# Patient Record
Sex: FEMALE | Race: BLACK OR AFRICAN AMERICAN | NOT HISPANIC OR LATINO | Employment: OTHER | URBAN - METROPOLITAN AREA
[De-identification: names, ages, dates, MRNs, and addresses within clinical notes are randomized per-mention and may not be internally consistent; named-entity substitution may affect disease eponyms.]

---

## 2023-02-21 LAB
ALANINE AMINOTRANSFERASE (SGPT) (U/L) IN SER/PLAS: 17 U/L (ref 7–45)
ALBUMIN (G/DL) IN SER/PLAS: 4.4 G/DL (ref 3.4–5)
ALKALINE PHOSPHATASE (U/L) IN SER/PLAS: 89 U/L (ref 33–136)
ANION GAP IN SER/PLAS: 10 MMOL/L (ref 10–20)
ASPARTATE AMINOTRANSFERASE (SGOT) (U/L) IN SER/PLAS: 23 U/L (ref 9–39)
BASOPHILS (10*3/UL) IN BLOOD BY AUTOMATED COUNT: 0.05 X10E9/L (ref 0–0.1)
BASOPHILS/100 LEUKOCYTES IN BLOOD BY AUTOMATED COUNT: 1 % (ref 0–2)
BILIRUBIN TOTAL (MG/DL) IN SER/PLAS: 0.7 MG/DL (ref 0–1.2)
CALCIDIOL (25 OH VITAMIN D3) (NG/ML) IN SER/PLAS: 37 NG/ML
CALCIUM (MG/DL) IN SER/PLAS: 9.2 MG/DL (ref 8.6–10.6)
CARBON DIOXIDE, TOTAL (MMOL/L) IN SER/PLAS: 31 MMOL/L (ref 21–32)
CHLORIDE (MMOL/L) IN SER/PLAS: 104 MMOL/L (ref 98–107)
CHOLESTEROL (MG/DL) IN SER/PLAS: 245 MG/DL (ref 0–199)
CHOLESTEROL IN HDL (MG/DL) IN SER/PLAS: 87.1 MG/DL
CHOLESTEROL/HDL RATIO: 2.8
CREATININE (MG/DL) IN SER/PLAS: 1.21 MG/DL (ref 0.5–1.05)
EOSINOPHILS (10*3/UL) IN BLOOD BY AUTOMATED COUNT: 0.09 X10E9/L (ref 0–0.4)
EOSINOPHILS/100 LEUKOCYTES IN BLOOD BY AUTOMATED COUNT: 1.8 % (ref 0–6)
ERYTHROCYTE DISTRIBUTION WIDTH (RATIO) BY AUTOMATED COUNT: 16 % (ref 11.5–14.5)
ERYTHROCYTE MEAN CORPUSCULAR HEMOGLOBIN CONCENTRATION (G/DL) BY AUTOMATED: 32.2 G/DL (ref 32–36)
ERYTHROCYTE MEAN CORPUSCULAR VOLUME (FL) BY AUTOMATED COUNT: 92 FL (ref 80–100)
ERYTHROCYTES (10*6/UL) IN BLOOD BY AUTOMATED COUNT: 4.03 X10E12/L (ref 4–5.2)
GFR FEMALE: 46 ML/MIN/1.73M2
GLUCOSE (MG/DL) IN SER/PLAS: 103 MG/DL (ref 74–99)
HEMATOCRIT (%) IN BLOOD BY AUTOMATED COUNT: 37 % (ref 36–46)
HEMOGLOBIN (G/DL) IN BLOOD: 11.9 G/DL (ref 12–16)
IMMATURE GRANULOCYTES/100 LEUKOCYTES IN BLOOD BY AUTOMATED COUNT: 0.2 % (ref 0–0.9)
LDL: 143 MG/DL (ref 0–99)
LEUKOCYTES (10*3/UL) IN BLOOD BY AUTOMATED COUNT: 5.1 X10E9/L (ref 4.4–11.3)
LYMPHOCYTES (10*3/UL) IN BLOOD BY AUTOMATED COUNT: 2.09 X10E9/L (ref 0.8–3)
LYMPHOCYTES/100 LEUKOCYTES IN BLOOD BY AUTOMATED COUNT: 41.1 % (ref 13–44)
MONOCYTES (10*3/UL) IN BLOOD BY AUTOMATED COUNT: 0.35 X10E9/L (ref 0.05–0.8)
MONOCYTES/100 LEUKOCYTES IN BLOOD BY AUTOMATED COUNT: 6.9 % (ref 2–10)
NEUTROPHILS (10*3/UL) IN BLOOD BY AUTOMATED COUNT: 2.5 X10E9/L (ref 1.6–5.5)
NEUTROPHILS/100 LEUKOCYTES IN BLOOD BY AUTOMATED COUNT: 49 % (ref 40–80)
NRBC (PER 100 WBCS) BY AUTOMATED COUNT: 0 /100 WBC (ref 0–0)
PARATHYRIN INTACT (PG/ML) IN SER/PLAS: 122.1 PG/ML (ref 18.5–88)
PLATELETS (10*3/UL) IN BLOOD AUTOMATED COUNT: 279 X10E9/L (ref 150–450)
POTASSIUM (MMOL/L) IN SER/PLAS: 3.8 MMOL/L (ref 3.5–5.3)
PROTEIN TOTAL: 7.1 G/DL (ref 6.4–8.2)
SODIUM (MMOL/L) IN SER/PLAS: 141 MMOL/L (ref 136–145)
THYROTROPIN (MIU/L) IN SER/PLAS BY DETECTION LIMIT <= 0.05 MIU/L: 1.74 MIU/L (ref 0.44–3.98)
TRIGLYCERIDE (MG/DL) IN SER/PLAS: 76 MG/DL (ref 0–149)
UREA NITROGEN (MG/DL) IN SER/PLAS: 17 MG/DL (ref 6–23)
VLDL: 15 MG/DL (ref 0–40)

## 2023-06-13 DIAGNOSIS — I10 HYPERTENSION, UNSPECIFIED TYPE: ICD-10-CM

## 2023-06-13 DIAGNOSIS — K21.9 GASTROESOPHAGEAL REFLUX DISEASE WITHOUT ESOPHAGITIS: ICD-10-CM

## 2023-06-15 RX ORDER — OMEPRAZOLE 20 MG/1
CAPSULE, DELAYED RELEASE ORAL
Qty: 90 CAPSULE | Refills: 0 | Status: SHIPPED | OUTPATIENT
Start: 2023-06-15 | End: 2023-08-28

## 2023-06-16 PROBLEM — E21.3 HYPERPARATHYROIDISM (MULTI): Status: ACTIVE | Noted: 2023-06-16

## 2023-06-16 PROBLEM — M67.40 GANGLION OF JOINT: Status: ACTIVE | Noted: 2023-06-16

## 2023-06-16 PROBLEM — M54.17 LUMBOSACRAL NEURITIS: Status: ACTIVE | Noted: 2023-06-16

## 2023-06-16 PROBLEM — L98.9 SKIN LESIONS: Status: ACTIVE | Noted: 2023-06-16

## 2023-06-16 PROBLEM — E87.6 DIURETIC-INDUCED HYPOKALEMIA: Status: ACTIVE | Noted: 2023-06-16

## 2023-06-16 PROBLEM — T50.2X5A DIURETIC-INDUCED HYPOKALEMIA: Status: ACTIVE | Noted: 2023-06-16

## 2023-06-16 PROBLEM — D64.9 ANEMIA: Status: ACTIVE | Noted: 2023-06-16

## 2023-06-16 PROBLEM — J31.0 RHINITIS: Status: ACTIVE | Noted: 2023-06-16

## 2023-06-16 PROBLEM — N18.32 CHRONIC KIDNEY DISEASE, STAGE 3B (MULTI): Status: ACTIVE | Noted: 2023-06-16

## 2023-06-16 PROBLEM — D36.9 TUBULAR ADENOMA: Status: ACTIVE | Noted: 2023-06-16

## 2023-06-16 PROBLEM — G47.33 OBSTRUCTIVE SLEEP APNEA: Status: ACTIVE | Noted: 2023-06-16

## 2023-06-16 PROBLEM — I10 BENIGN ESSENTIAL HYPERTENSION: Status: ACTIVE | Noted: 2023-06-16

## 2023-06-16 PROBLEM — E78.5 HLD (HYPERLIPIDEMIA): Status: ACTIVE | Noted: 2023-06-16

## 2023-06-16 PROBLEM — K21.9 GASTROESOPHAGEAL REFLUX DISEASE: Status: ACTIVE | Noted: 2023-06-16

## 2023-06-16 PROBLEM — E55.9 VITAMIN D DEFICIENCY: Status: ACTIVE | Noted: 2023-06-16

## 2023-06-16 RX ORDER — HYDROCHLOROTHIAZIDE 12.5 MG/1
1 CAPSULE ORAL DAILY
COMMUNITY
Start: 2015-01-13 | End: 2023-06-16 | Stop reason: SDUPTHER

## 2023-06-16 NOTE — TELEPHONE ENCOUNTER
Patient stated [facility] hasn't received her request for Amlodipine and Hydrochlorothiazide. Please, assist.

## 2023-06-19 DIAGNOSIS — I10 HYPERTENSION, UNSPECIFIED TYPE: ICD-10-CM

## 2023-06-19 RX ORDER — HYDROCHLOROTHIAZIDE 12.5 MG/1
12.5 CAPSULE ORAL DAILY
Qty: 90 CAPSULE | Refills: 1 | Status: SHIPPED | OUTPATIENT
Start: 2023-06-18 | End: 2023-06-19

## 2023-06-19 RX ORDER — AMLODIPINE BESYLATE 10 MG/1
10 TABLET ORAL DAILY
Qty: 90 TABLET | Refills: 1 | Status: SHIPPED | OUTPATIENT
Start: 2023-06-18 | End: 2023-11-17

## 2023-06-19 RX ORDER — HYDROCHLOROTHIAZIDE 12.5 MG/1
CAPSULE ORAL
Qty: 90 CAPSULE | Refills: 3 | Status: SHIPPED | OUTPATIENT
Start: 2023-06-19 | End: 2024-01-10

## 2023-08-08 ENCOUNTER — TELEPHONE (OUTPATIENT)
Dept: PRIMARY CARE | Facility: CLINIC | Age: 78
End: 2023-08-08

## 2023-08-08 DIAGNOSIS — B35.1 ONYCHOMYCOSIS: Primary | ICD-10-CM

## 2023-08-08 NOTE — TELEPHONE ENCOUNTER
Patient called and requested a order to be placed for the following:  Podiatry     Patient stated that she is having the following symptoms:  Fungus   Dark spots on feet     If approved , Please advise and I will call for continuing details

## 2023-09-25 ENCOUNTER — LAB (OUTPATIENT)
Dept: LAB | Facility: LAB | Age: 78
End: 2023-09-25
Payer: MEDICARE

## 2023-09-25 ENCOUNTER — OFFICE VISIT (OUTPATIENT)
Dept: PRIMARY CARE | Facility: CLINIC | Age: 78
End: 2023-09-25
Payer: MEDICARE

## 2023-09-25 ENCOUNTER — APPOINTMENT (OUTPATIENT)
Dept: PRIMARY CARE | Facility: CLINIC | Age: 78
End: 2023-09-25
Payer: MEDICARE

## 2023-09-25 VITALS
DIASTOLIC BLOOD PRESSURE: 60 MMHG | SYSTOLIC BLOOD PRESSURE: 112 MMHG | OXYGEN SATURATION: 98 % | TEMPERATURE: 96 F | BODY MASS INDEX: 31.04 KG/M2 | WEIGHT: 175.2 LBS | HEART RATE: 65 BPM | RESPIRATION RATE: 12 BRPM | HEIGHT: 63 IN

## 2023-09-25 DIAGNOSIS — I10 BENIGN ESSENTIAL HYPERTENSION: ICD-10-CM

## 2023-09-25 DIAGNOSIS — Z00.00 MEDICARE ANNUAL WELLNESS VISIT, SUBSEQUENT: Primary | ICD-10-CM

## 2023-09-25 DIAGNOSIS — N18.32 CHRONIC KIDNEY DISEASE, STAGE 3B (MULTI): ICD-10-CM

## 2023-09-25 DIAGNOSIS — E21.3 HYPERPARATHYROIDISM (MULTI): ICD-10-CM

## 2023-09-25 DIAGNOSIS — E66.9 CLASS 1 OBESITY WITH SERIOUS COMORBIDITY AND BODY MASS INDEX (BMI) OF 31.0 TO 31.9 IN ADULT, UNSPECIFIED OBESITY TYPE: ICD-10-CM

## 2023-09-25 LAB
CALCIDIOL (25 OH VITAMIN D3) (NG/ML) IN SER/PLAS: 46 NG/ML
PARATHYRIN INTACT (PG/ML) IN SER/PLAS: 86.4 PG/ML (ref 18.5–88)

## 2023-09-25 PROCEDURE — 3074F SYST BP LT 130 MM HG: CPT | Performed by: FAMILY MEDICINE

## 2023-09-25 PROCEDURE — 1159F MED LIST DOCD IN RCRD: CPT | Performed by: FAMILY MEDICINE

## 2023-09-25 PROCEDURE — 1170F FXNL STATUS ASSESSED: CPT | Performed by: FAMILY MEDICINE

## 2023-09-25 PROCEDURE — 1160F RVW MEDS BY RX/DR IN RCRD: CPT | Performed by: FAMILY MEDICINE

## 2023-09-25 PROCEDURE — 80053 COMPREHEN METABOLIC PANEL: CPT

## 2023-09-25 PROCEDURE — 36415 COLL VENOUS BLD VENIPUNCTURE: CPT

## 2023-09-25 PROCEDURE — 82306 VITAMIN D 25 HYDROXY: CPT

## 2023-09-25 PROCEDURE — 83970 ASSAY OF PARATHORMONE: CPT

## 2023-09-25 PROCEDURE — G0439 PPPS, SUBSEQ VISIT: HCPCS | Performed by: FAMILY MEDICINE

## 2023-09-25 PROCEDURE — 99397 PER PM REEVAL EST PAT 65+ YR: CPT | Performed by: FAMILY MEDICINE

## 2023-09-25 PROCEDURE — G0444 DEPRESSION SCREEN ANNUAL: HCPCS | Performed by: FAMILY MEDICINE

## 2023-09-25 PROCEDURE — G0447 BEHAVIOR COUNSEL OBESITY 15M: HCPCS | Performed by: FAMILY MEDICINE

## 2023-09-25 PROCEDURE — 1036F TOBACCO NON-USER: CPT | Performed by: FAMILY MEDICINE

## 2023-09-25 PROCEDURE — 1126F AMNT PAIN NOTED NONE PRSNT: CPT | Performed by: FAMILY MEDICINE

## 2023-09-25 PROCEDURE — 3078F DIAST BP <80 MM HG: CPT | Performed by: FAMILY MEDICINE

## 2023-09-25 RX ORDER — ACETAMINOPHEN 500 MG
1 TABLET ORAL DAILY
COMMUNITY

## 2023-09-25 RX ORDER — BIOTIN 10 MG
1 TABLET ORAL DAILY
COMMUNITY

## 2023-09-25 ASSESSMENT — PATIENT HEALTH QUESTIONNAIRE - PHQ9
SUM OF ALL RESPONSES TO PHQ9 QUESTIONS 1 AND 2: 0
2. FEELING DOWN, DEPRESSED OR HOPELESS: NOT AT ALL
1. LITTLE INTEREST OR PLEASURE IN DOING THINGS: NOT AT ALL

## 2023-09-25 ASSESSMENT — LIFESTYLE VARIABLES: HOW MANY STANDARD DRINKS CONTAINING ALCOHOL DO YOU HAVE ON A TYPICAL DAY: PATIENT DOES NOT DRINK

## 2023-09-25 ASSESSMENT — ACTIVITIES OF DAILY LIVING (ADL)
DOING_HOUSEWORK: INDEPENDENT
DRESSING: INDEPENDENT
BATHING: INDEPENDENT
TAKING_MEDICATION: INDEPENDENT
GROCERY_SHOPPING: INDEPENDENT
MANAGING_FINANCES: INDEPENDENT

## 2023-09-25 NOTE — PROGRESS NOTES
"Subjective   Reason for Visit: Ely Mann is an 78 y.o. female here for a Medicare Wellness visit.     Past Medical, Surgical, and Family History reviewed and updated in chart.    Reviewed all medications by prescribing practitioner or clinical pharmacist (such as prescriptions, OTCs, herbal therapies and supplements) and documented in the medical record.    HPI    Patient Care Team:  Livia Jeronimo DO as PCP - General  Livia Jeronimo DO as PCP - [employer] PCP     HTN  BP at goal today in office.  Using medications without issues.  Denies CP, SOB, palpitations, change in vision, dizziness, N/V.    ---Social---  Job: retired [employer]  :   Kids: 3 (grands 7)  Likes: everything- garden/ Jainism/casinos      Review of Systems  All systems reviewed and neg if not noted in the HPI above     Objective   Vitals:  /60 (Patient Position: Sitting)   Pulse 65   Temp 35.6 °C (96 °F)   Resp 12   Ht 1.6 m (5' 3\")   Wt 79.5 kg (175 lb 3.2 oz)   SpO2 98%   BMI 31.04 kg/m²       Physical Exam  Pleasant  Eyes: conjunctiva non-icteric and eye lids are without obvious rash or drooping. Pupils are symmetric.   Ears, Nose, Mouth, and Throat: External ears and nose appear to be without deformity or rash. No lesions or masses noted. Hearing is grossly intact.   CV: RRR, no murmur  Carotids: no bruits  Pulm:CTA B/L  Abd: soft, NTTP, + BS  LE: no edema  Psychiatric: Alert, orientation to person, place, and time. Recent/remote memory as evidenced through face-to-face interaction and discussion appear grossly intact. Mood and affect are normal.      Assessment/Plan   Problem List Items Addressed This Visit       Chronic kidney disease, stage 3b (CMS/HCC)    Current Assessment & Plan     Stable  Continue to monitor         Relevant Orders    Basic metabolic panel    Hyperparathyroidism (CMS/HCC)    Current Assessment & Plan     Ordered labs for recheck  Follow upw with endo         Relevant Orders    " PTH, intact     Other Visit Diagnoses       Medicare annual wellness visit, subsequent    -  Primary    Class 1 obesity with serious comorbidity and body mass index (BMI) of 31.0 to 31.9 in adult, unspecified obesity type              Please follow up in 1yr for medicare wellness and 6months for HTN or as needed.

## 2023-09-25 NOTE — PATIENT INSTRUCTIONS
Please follow up in 1yr for medicare wellness and 6months for HTN or as needed.       ** If labs or imaging ordered at today's visit, all the non-urgent results will be discussed at your next visit    If you have been referred for a special test or to a specialist please call  [phone] to schedule an appointment.  If you have any further questions, or if develop new or worsened symptoms, please give our office a call at [phone].

## 2023-09-26 LAB
ALANINE AMINOTRANSFERASE (SGPT) (U/L) IN SER/PLAS: 22 U/L (ref 7–45)
ALBUMIN (G/DL) IN SER/PLAS: 4.6 G/DL (ref 3.4–5)
ALKALINE PHOSPHATASE (U/L) IN SER/PLAS: 86 U/L (ref 33–136)
ANION GAP IN SER/PLAS: 13 MMOL/L (ref 10–20)
ASPARTATE AMINOTRANSFERASE (SGOT) (U/L) IN SER/PLAS: 25 U/L (ref 9–39)
BILIRUBIN TOTAL (MG/DL) IN SER/PLAS: 0.7 MG/DL (ref 0–1.2)
CALCIUM (MG/DL) IN SER/PLAS: 9.9 MG/DL (ref 8.6–10.6)
CARBON DIOXIDE, TOTAL (MMOL/L) IN SER/PLAS: 28 MMOL/L (ref 21–32)
CHLORIDE (MMOL/L) IN SER/PLAS: 103 MMOL/L (ref 98–107)
CREATININE (MG/DL) IN SER/PLAS: 1.16 MG/DL (ref 0.5–1.05)
GFR FEMALE: 48 ML/MIN/1.73M2
GLUCOSE (MG/DL) IN SER/PLAS: 91 MG/DL (ref 74–99)
POTASSIUM (MMOL/L) IN SER/PLAS: 4.1 MMOL/L (ref 3.5–5.3)
PROTEIN TOTAL: 7.8 G/DL (ref 6.4–8.2)
SODIUM (MMOL/L) IN SER/PLAS: 140 MMOL/L (ref 136–145)
UREA NITROGEN (MG/DL) IN SER/PLAS: 16 MG/DL (ref 6–23)

## 2023-11-15 DIAGNOSIS — I10 HYPERTENSION, UNSPECIFIED TYPE: ICD-10-CM

## 2023-11-15 DIAGNOSIS — K21.9 GASTROESOPHAGEAL REFLUX DISEASE WITHOUT ESOPHAGITIS: ICD-10-CM

## 2023-11-17 RX ORDER — AMLODIPINE BESYLATE 10 MG/1
10 TABLET ORAL DAILY
Qty: 90 TABLET | Refills: 3 | Status: SHIPPED | OUTPATIENT
Start: 2023-11-17

## 2023-11-17 RX ORDER — OMEPRAZOLE 20 MG/1
CAPSULE, DELAYED RELEASE ORAL
Qty: 90 CAPSULE | Refills: 0 | Status: SHIPPED | OUTPATIENT
Start: 2023-11-17 | End: 2024-01-19

## 2024-01-03 DIAGNOSIS — I10 HYPERTENSION, UNSPECIFIED TYPE: ICD-10-CM

## 2024-01-10 RX ORDER — HYDROCHLOROTHIAZIDE 12.5 MG/1
12.5 CAPSULE ORAL DAILY
Qty: 90 CAPSULE | Refills: 3 | Status: SHIPPED | OUTPATIENT
Start: 2024-01-10

## 2024-01-16 DIAGNOSIS — K21.9 GASTROESOPHAGEAL REFLUX DISEASE WITHOUT ESOPHAGITIS: ICD-10-CM

## 2024-01-19 RX ORDER — OMEPRAZOLE 20 MG/1
CAPSULE, DELAYED RELEASE ORAL
Qty: 90 CAPSULE | Refills: 0 | Status: SHIPPED | OUTPATIENT
Start: 2024-01-19 | End: 2024-03-22

## 2024-03-18 ENCOUNTER — LAB (OUTPATIENT)
Dept: LAB | Facility: LAB | Age: 79
End: 2024-03-18
Payer: MEDICARE

## 2024-03-18 ENCOUNTER — OFFICE VISIT (OUTPATIENT)
Dept: PRIMARY CARE | Facility: CLINIC | Age: 79
End: 2024-03-18
Payer: MEDICARE

## 2024-03-18 VITALS
TEMPERATURE: 98.1 F | WEIGHT: 178.5 LBS | BODY MASS INDEX: 31.63 KG/M2 | DIASTOLIC BLOOD PRESSURE: 60 MMHG | OXYGEN SATURATION: 98 % | HEIGHT: 63 IN | SYSTOLIC BLOOD PRESSURE: 130 MMHG | HEART RATE: 70 BPM | RESPIRATION RATE: 16 BRPM

## 2024-03-18 DIAGNOSIS — E78.5 HYPERLIPIDEMIA, UNSPECIFIED HYPERLIPIDEMIA TYPE: ICD-10-CM

## 2024-03-18 DIAGNOSIS — I10 BENIGN ESSENTIAL HYPERTENSION: ICD-10-CM

## 2024-03-18 DIAGNOSIS — E21.3 HYPERPARATHYROIDISM (MULTI): ICD-10-CM

## 2024-03-18 DIAGNOSIS — N18.32 CHRONIC KIDNEY DISEASE, STAGE 3B (MULTI): ICD-10-CM

## 2024-03-18 DIAGNOSIS — G47.33 OBSTRUCTIVE SLEEP APNEA: ICD-10-CM

## 2024-03-18 DIAGNOSIS — Z12.31 ENCOUNTER FOR SCREENING MAMMOGRAM FOR BREAST CANCER: ICD-10-CM

## 2024-03-18 DIAGNOSIS — I10 BENIGN ESSENTIAL HYPERTENSION: Primary | ICD-10-CM

## 2024-03-18 LAB
ALBUMIN SERPL BCP-MCNC: 4.7 G/DL (ref 3.4–5)
ANION GAP SERPL CALC-SCNC: 14 MMOL/L (ref 10–20)
BUN SERPL-MCNC: 16 MG/DL (ref 6–23)
CALCIUM SERPL-MCNC: 9.5 MG/DL (ref 8.6–10.6)
CHLORIDE SERPL-SCNC: 102 MMOL/L (ref 98–107)
CHOLEST SERPL-MCNC: 266 MG/DL (ref 0–199)
CHOLESTEROL/HDL RATIO: 2.9
CO2 SERPL-SCNC: 29 MMOL/L (ref 21–32)
CREAT SERPL-MCNC: 1.09 MG/DL (ref 0.5–1.05)
EGFRCR SERPLBLD CKD-EPI 2021: 51 ML/MIN/1.73M*2
GLUCOSE SERPL-MCNC: 100 MG/DL (ref 74–99)
HDLC SERPL-MCNC: 91.1 MG/DL
LDLC SERPL CALC-MCNC: 160 MG/DL
NON HDL CHOLESTEROL: 175 MG/DL (ref 0–149)
PHOSPHATE SERPL-MCNC: 3.1 MG/DL (ref 2.5–4.9)
POTASSIUM SERPL-SCNC: 4.3 MMOL/L (ref 3.5–5.3)
SODIUM SERPL-SCNC: 141 MMOL/L (ref 136–145)
TRIGL SERPL-MCNC: 76 MG/DL (ref 0–149)
TSH SERPL-ACNC: 1.31 MIU/L (ref 0.44–3.98)
VLDL: 15 MG/DL (ref 0–40)

## 2024-03-18 PROCEDURE — 80069 RENAL FUNCTION PANEL: CPT

## 2024-03-18 PROCEDURE — 3078F DIAST BP <80 MM HG: CPT | Performed by: FAMILY MEDICINE

## 2024-03-18 PROCEDURE — 1160F RVW MEDS BY RX/DR IN RCRD: CPT | Performed by: FAMILY MEDICINE

## 2024-03-18 PROCEDURE — 1036F TOBACCO NON-USER: CPT | Performed by: FAMILY MEDICINE

## 2024-03-18 PROCEDURE — 80061 LIPID PANEL: CPT

## 2024-03-18 PROCEDURE — 36415 COLL VENOUS BLD VENIPUNCTURE: CPT

## 2024-03-18 PROCEDURE — 99213 OFFICE O/P EST LOW 20 MIN: CPT | Performed by: FAMILY MEDICINE

## 2024-03-18 PROCEDURE — 1159F MED LIST DOCD IN RCRD: CPT | Performed by: FAMILY MEDICINE

## 2024-03-18 PROCEDURE — 84443 ASSAY THYROID STIM HORMONE: CPT

## 2024-03-18 PROCEDURE — 3075F SYST BP GE 130 - 139MM HG: CPT | Performed by: FAMILY MEDICINE

## 2024-03-18 ASSESSMENT — LIFESTYLE VARIABLES
HOW OFTEN DO YOU HAVE SIX OR MORE DRINKS ON ONE OCCASION: NEVER
SKIP TO QUESTIONS 9-10: 1
AUDIT-C TOTAL SCORE: 1
HOW MANY STANDARD DRINKS CONTAINING ALCOHOL DO YOU HAVE ON A TYPICAL DAY: 1 OR 2
HOW OFTEN DO YOU HAVE A DRINK CONTAINING ALCOHOL: MONTHLY OR LESS

## 2024-03-18 ASSESSMENT — PATIENT HEALTH QUESTIONNAIRE - PHQ9
1. LITTLE INTEREST OR PLEASURE IN DOING THINGS: NOT AT ALL
SUM OF ALL RESPONSES TO PHQ9 QUESTIONS 1 & 2: 0
2. FEELING DOWN, DEPRESSED OR HOPELESS: NOT AT ALL

## 2024-03-18 NOTE — PATIENT INSTRUCTIONS
RSV- can get from your local pharm    Be on the look out for a call from the [facility] pharm    HTN  - labs today  - Your blood pressure is at goal today- goal is less than 130/80  - Continue your current medications  - Weight loss can help lower your bp!  Work on a healthy whole food diet and add at least 30min of exercise 5 days per week  - Work on a low salt diet    Mammogram due in Aug    To call sleep medicine for follow up      Please follow up in 6 months for medicare wellness ( already scheduled) or as needed.       ** If labs or imaging ordered at today's visit, all the non-urgent results will be discussed at your next visit    If you have been referred for a special test or to a specialist please call  [phone] to schedule an appointment.  If you have any further questions, or if develop new or worsened symptoms, please give our office a call at [phone].

## 2024-03-18 NOTE — PROGRESS NOTES
"Subjective   Patient ID: Ely Mann is a 79 y.o. female who presents for Hypertension.    HPI     HTN  BP at goal today in office.  Using medications without issues.  Denies CP, SOB, palpitations, change in vision, dizziness, N/V.    John  Unable to sleep the whole night with cpap  Followed by sleep team- past due for appt    CKD- stable        Review of Systems  All systems reviewed and neg if not noted in the HPI above       Objective   /60 (BP Location: Right arm, Patient Position: Sitting, BP Cuff Size: Adult)   Pulse 70   Temp 36.7 °C (98.1 °F)   Resp 16   Ht 1.6 m (5' 3\")   Wt 81 kg (178 lb 8 oz)   SpO2 98%   BMI 31.62 kg/m²     Physical Exam  Pleasant  Eyes: conjunctiva non-icteric and eye lids are without obvious rash or drooping. Pupils are symmetric.   Ears, Nose, Mouth, and Throat: External ears and nose appear to be without deformity or rash. No lesions or masses noted. Hearing is grossly intact.   CV: RRR, no murmur  Carotids: no bruits  Pulm:CTA B/L  Abd: soft, NTTP, + BS  LE: no edema  Psychiatric: Alert, orientation to person, place, and time. Recent/remote memory as evidenced through face-to-face interaction and discussion appear grossly intact. Mood and affect are normal.       Assessment/Plan   Problem List Items Addressed This Visit             ICD-10-CM    Benign essential hypertension - Primary I10    Relevant Orders    Follow Up In [facility] - Pharmacy    Lipid Panel    TSH with reflex to Free T4 if abnormal    Renal function panel    Chronic kidney disease, stage 3b (CMS/HCC) N18.32     Gfr 48  Continue to monitor  No need for renal follow up- will continue to check every 6-12months         Relevant Orders    Follow Up In [facility] - Pharmacy    TSH with reflex to Free T4 if abnormal    Renal function panel    HLD (hyperlipidemia) E78.5    Relevant Orders    Lipid Panel    TSH with reflex to Free T4 if abnormal    Hyperparathyroidism (CMS/HCC) E21.3     " Levels are improved  Continue to monitor         Relevant Orders    TSH with reflex to Free T4 if abnormal    Renal function panel    Obstructive sleep apnea G47.33   To call sleep medicine for follow up    Other Visit Diagnoses         Codes    Encounter for screening mammogram for breast cancer     Z12.31    Relevant Orders    BI mammo bilateral screening tomosynthesis            Please follow up in 6months for medicare wellness ( already scheduled) or as needed.

## 2024-04-04 ENCOUNTER — TELEMEDICINE (OUTPATIENT)
Dept: PHARMACY | Facility: HOSPITAL | Age: 79
End: 2024-04-04
Payer: MEDICARE

## 2024-04-04 DIAGNOSIS — N18.32 CHRONIC KIDNEY DISEASE, STAGE 3B (MULTI): Primary | ICD-10-CM

## 2024-04-04 DIAGNOSIS — I10 BENIGN ESSENTIAL HYPERTENSION: ICD-10-CM

## 2024-04-04 NOTE — ASSESSMENT & PLAN NOTE
Patient's most recent in-office blood pressure from 3/18/2024 was 130/60. Patient does not currently monitor her BP at home.     PLAN:  Continue  Amlodipine 10 mg; take 1 tablet by mouth daily  Hydrochlorothiazide 12.5 mg; take 1 capsule by mouth daily

## 2024-04-04 NOTE — ASSESSMENT & PLAN NOTE
Patient has stage 3 CKD, her most recent eGFR from 3/18/2024 was 51 mL/min. Patient is not currently on any medications that need to be renally dose adjusted.    Patient has an allergy to ACE inhibitors and ARBS, so patient is unable to take. Patient does not have any contraindications to SGLT2 therapy. Jardiance test claim from 4/4/2024 was $31.80.    Jardiance Education:     - Counseled patient on Jardiance MOA, expectations, side effects, duration of therapy, administration, and monitoring parameters.  - Reviewed the benefits of SGLT-2i therapy, such as glycemic control and kidney and CV protection.  - Advised patient to practice proper  hygiene to reduce risk of UTIs or yeast infections.  - Advised patient to maintain adequate fluid intake to remain hydrated while on SGLT2i therapy.  - Answered all patient questions and concerns.     Patient Assistance Program (PAP)    Patient verbally reports monthly or yearly income which is less than 400% federal poverty level    Application for program to be submitted for the following medications: Jardiance 10 mg    Prescription Insurance: Yes  Members of Household: 1  Files Taxes: Yes    Patient will bring the paperwork to the [place] office 4/11/2024.

## 2024-04-04 NOTE — PROGRESS NOTES
"Subjective     Patient ID: Ely Mann is a 79 y.o. female who presents for Hypertension and Chronic Kidney Disease.    Referring Provider: Livia Jeronimo DO     Patient presenting for her initial visit. Patient has hypertension, she does not currently monitor her blood pressure at home, most recent in office reading from 3/18/2024 was 130/60. Patient has CKD, most recent eGFR from 3/18/2024 was 51 mL/min.      Allergies   Allergen Reactions    Ace Inhibitors Unknown    Arb-Angiotensin Receptor Antagonist Unknown    Aspirin Unknown    Caffeine Unknown    Trolamine Salicylate Other       Objective     Current Hypertensive Pharmacotherapy:   Amlodipine 10 mg; take 1 tablet by mouth daily  Hydrochlorothiazide 12.5 mg; take 1 capsule by mouth daily    Home BP Monitoring:  Patient is not currently monitoring her BP at home, her most recent in office reading from 3/18/2024 was 130/60.    Lab Review  Lab Results   Component Value Date    BILITOT 0.7 09/25/2023    CALCIUM 9.5 03/18/2024    CO2 29 03/18/2024     03/18/2024    CREATININE 1.09 (H) 03/18/2024    GLUCOSE 100 (H) 03/18/2024    ALKPHOS 86 09/25/2023    K 4.3 03/18/2024    PROT 7.8 09/25/2023     03/18/2024    AST 25 09/25/2023    ALT 22 09/25/2023    BUN 16 03/18/2024    ANIONGAP 14 03/18/2024    PHOS 3.1 03/18/2024    ALBUMIN 4.7 03/18/2024    GFRF 48 (A) 09/25/2023     Lab Results   Component Value Date    TRIG 76 03/18/2024    CHOL 266 (H) 03/18/2024    LDLCALC 160 (H) 03/18/2024    HDL 91.1 03/18/2024     No results found for: \"HGBA1C\"  The ASCVD Risk score (Jaime DK, et al., 2019) failed to calculate for the following reasons:    The 2019 ASCVD risk score is only valid for ages 40 to 79      Assessment/Plan     Problem List Items Addressed This Visit       Benign essential hypertension     Patient's most recent in-office blood pressure from 3/18/2024 was 130/60. Patient does not currently monitor her BP at home. "     PLAN:  Continue  Amlodipine 10 mg; take 1 tablet by mouth daily  Hydrochlorothiazide 12.5 mg; take 1 capsule by mouth daily         Chronic kidney disease, stage 3b (CMS/HCC) - Primary     Patient has stage 3 CKD, her most recent eGFR from 3/18/2024 was 51 mL/min. Patient is not currently on any medications that need to be renally dose adjusted.    Patient has an allergy to ACE inhibitors and ARBS, so patient is unable to take. Patient does not have any contraindications to SGLT2 therapy. Jardiance test claim from 4/4/2024 was $31.80.    Jardiance Education:     - Counseled patient on Jardiance MOA, expectations, side effects, duration of therapy, administration, and monitoring parameters.  - Reviewed the benefits of SGLT-2i therapy, such as glycemic control and kidney and CV protection.  - Advised patient to practice proper  hygiene to reduce risk of UTIs or yeast infections.  - Advised patient to maintain adequate fluid intake to remain hydrated while on SGLT2i therapy.  - Answered all patient questions and concerns.     Patient Assistance Program (PAP)    Patient verbally reports monthly or yearly income which is less than 400% federal poverty level    Application for program to be submitted for the following medications: Jardiance 10 mg    Prescription Insurance: Yes  Members of Household: 1  Files Taxes: Yes    Patient will bring the paperwork to the [place] office 4/11/2024.               Relevant Orders    Follow Up In [facility] - Pharmacy         Follow up: I recommend follow-up be 3 weeks.    Barbie Crook, Pharm. D.  PGY-1 Pharmacy Resident    Continue all meds under the continuation of care with the referring provider and clinical pharmacy team

## 2024-04-12 ENCOUNTER — SPECIALTY PHARMACY (OUTPATIENT)
Dept: PHARMACY | Facility: CLINIC | Age: 79
End: 2024-04-12

## 2024-04-12 ENCOUNTER — PHARMACY VISIT (OUTPATIENT)
Dept: PHARMACY | Facility: CLINIC | Age: 79
End: 2024-04-12
Payer: COMMERCIAL

## 2024-04-12 PROCEDURE — RXMED WILLOW AMBULATORY MEDICATION CHARGE

## 2024-04-25 ENCOUNTER — TELEMEDICINE (OUTPATIENT)
Dept: PHARMACY | Facility: HOSPITAL | Age: 79
End: 2024-04-25
Payer: MEDICARE

## 2024-04-25 DIAGNOSIS — N18.32 CHRONIC KIDNEY DISEASE, STAGE 3B (MULTI): ICD-10-CM

## 2024-04-25 DIAGNOSIS — I10 BENIGN ESSENTIAL HYPERTENSION: Primary | ICD-10-CM

## 2024-04-25 NOTE — ASSESSMENT & PLAN NOTE
Patient has stage 3 CKD, her most recent eGFR from 3/18/2024 was 51 mL/min. Patient is not currently on any medications that need to be renally dose adjusted.  Patient is currently taking Jardiance 10 mg daily, she states that she is doing well on the medication and is not experiencing any adverse effects. She has an allergy to ACE inhibitors and ARBs, so she is not able to take those medications.     PLAN:  Continue  Jardiance 10 mg; take 1 tablet by mouth daily.

## 2024-04-25 NOTE — ASSESSMENT & PLAN NOTE
Patient's BP is currently controlled, her most recent in office blood pressure from 3/18/2024 was 130/60. Patient does not currently monitor her BP at home.     PLAN:  Continue  Amlodipine 10 mg; take 1 tablet by mouth daily  Hydrochlorothiazide 12.5 mg; take 1 capsule by mouth daily

## 2024-04-25 NOTE — PROGRESS NOTES
"Subjective     Patient ID: Ely Mann is a 79 y.o. female who presents for Hypertension and Chronic Kidney Disease.    Referring Provider: Livia Jeronimo DO     Patient presents for follow-up for hypertension and CKD. Patient does not monitor her BP at home, but her most recent in office BP from 3/18/2024 was 130/60. Her most recent eGFR from 3/18/2024 was 51 mL/min.       Diet: Patient states that she eats a lot of vegetables (cabbage, esme greens, peas) and fruits like kiwis. Patient eats chicken a lot as a source of protein, occasionally will eat ham, but she tries to not eat it too much.     Exercise: Patient states that she either goes to the gym or runs on the treadmill about 4 times a week.    Allergies   Allergen Reactions    Ace Inhibitors Unknown    Arb-Angiotensin Receptor Antagonist Unknown    Aspirin Unknown    Caffeine Unknown    Trolamine Salicylate Other       Objective     Current Anti-hypertensive Pharmacotherapy:   Amlodipine 10 mg; take 1 tablet by mouth daily  Hydrochlorothiazide 12.5 mg; take 1 capsule by mouth daily    Home BP Monitoring:   Patient is not currently monitoring her BP at home, her most recent in office reading from 3/18/2024 was 130/60.    Lab Review  Lab Results   Component Value Date    BILITOT 0.7 09/25/2023    CALCIUM 9.5 03/18/2024    CO2 29 03/18/2024     03/18/2024    CREATININE 1.09 (H) 03/18/2024    GLUCOSE 100 (H) 03/18/2024    ALKPHOS 86 09/25/2023    K 4.3 03/18/2024    PROT 7.8 09/25/2023     03/18/2024    AST 25 09/25/2023    ALT 22 09/25/2023    BUN 16 03/18/2024    ANIONGAP 14 03/18/2024    PHOS 3.1 03/18/2024    ALBUMIN 4.7 03/18/2024    GFRF 48 (A) 09/25/2023     Lab Results   Component Value Date    TRIG 76 03/18/2024    CHOL 266 (H) 03/18/2024    LDLCALC 160 (H) 03/18/2024    HDL 91.1 03/18/2024     No results found for: \"HGBA1C\"  The ASCVD Risk score ([place] DK, et al., 2019) failed to calculate for the following reasons:    The 2019 " ASCVD risk score is only valid for ages 40 to 79      Assessment/Plan     Problem List Items Addressed This Visit       Benign essential hypertension - Primary     Patient's BP is currently controlled, her most recent in office blood pressure from 3/18/2024 was 130/60. Patient does not currently monitor her BP at home.     PLAN:  Continue  Amlodipine 10 mg; take 1 tablet by mouth daily  Hydrochlorothiazide 12.5 mg; take 1 capsule by mouth daily         Chronic kidney disease, stage 3b (Multi)     Patient has stage 3 CKD, her most recent eGFR from 3/18/2024 was 51 mL/min. Patient is not currently on any medications that need to be renally dose adjusted.  Patient is currently taking Jardiance 10 mg daily, she states that she is doing well on the medication and is not experiencing any adverse effects. She has an allergy to ACE inhibitors and ARBs, so she is not able to take those medications.     PLAN:  Continue  Jardiance 10 mg; take 1 tablet by mouth daily.            Follow up: I recommend return to care be 4 weeks.    Barbie Crook, Pharm. D.  PGY-1 Pharmacy Resident    Continue all meds under the continuation of care with the referring provider and clinical pharmacy team

## 2024-05-07 PROCEDURE — RXMED WILLOW AMBULATORY MEDICATION CHARGE

## 2024-05-10 ENCOUNTER — PHARMACY VISIT (OUTPATIENT)
Dept: PHARMACY | Facility: CLINIC | Age: 79
End: 2024-05-10
Payer: COMMERCIAL

## 2024-05-22 NOTE — PROGRESS NOTES
Subjective   Patient ID: Ely Mann is a 79 y.o. female who presents for Chronic Kidney Disease and Hypertension.    Patient presents for a follow-up visit for CKD. She is currently doing well on the Jardiance. But is unable to take an ACE or ARB due to a documented allergy in the patient's profile. Patient tries to eat a healthy diet, has oatmeal in the mornings and later in the day she eats a lot of fruits and vegetables, sometimes eats sausage or chicken.      Objective   CKD ASSESSMENT    Renal Function  CKD Stage: 3  eGFR: 51 mL/min/1.73 m2 as of 3/18/2024  sCr: 1.09 mg/dL as of 3/18/2024    Hypertension  BP: controlled  Patient does not check her BP at home    Per chart review of vitals:  Systolic Diastolic Date   130 60 3/18/2024     Medications:   Amlodipine 10 mg daily  Hydrochlorothiazide 12.5 mg daily    Medications reviewed for appropriate dosing in setting of CKD  Recommended changes: none, all medications dosed appropriately      Assessment/Plan   Problem List Items Addressed This Visit       Benign essential hypertension - Primary     Patient's BP is currently controlled, her most recent in office blood pressure from 3/18/2024 was 130/60. Patient does not currently monitor her BP at home.      PLAN:  Continue  1. Amlodipine 10 mg; take 1 tablet by mouth daily  2. Hydrochlorothiazide 12.5 mg; take 1 capsule by mouth daily         Chronic kidney disease, stage 3b (Multi)     Patient has stage 3 CKD, her most recent eGFR from 3/18/2024 was 51 mL/min. Patient is not currently on any medications that need to be renally dose adjusted.  Patient is currently taking Jardiance 10 mg daily, she states that she is doing well on the medication and is not experiencing any adverse effects. She has an allergy to ACE inhibitors and ARBs, so she is not able to take those medications.      PLAN:  Continue  1. Jardiance 10 mg; take 1 tablet by mouth daily.          Recommend follow-up in 4 weeks.     Barbie Crook  Pharm. D.  PGY-1 Pharmacy Resident

## 2024-05-23 ENCOUNTER — TELEMEDICINE (OUTPATIENT)
Dept: PHARMACY | Facility: HOSPITAL | Age: 79
End: 2024-05-23
Payer: MEDICARE

## 2024-05-23 DIAGNOSIS — I10 BENIGN ESSENTIAL HYPERTENSION: Primary | ICD-10-CM

## 2024-05-23 DIAGNOSIS — N18.32 CHRONIC KIDNEY DISEASE, STAGE 3B (MULTI): ICD-10-CM

## 2024-05-23 NOTE — ASSESSMENT & PLAN NOTE
Patient's BP is currently controlled, her most recent in office blood pressure from 3/18/2024 was 130/60. Patient does not currently monitor her BP at home.      PLAN:  Continue  1. Amlodipine 10 mg; take 1 tablet by mouth daily  2. Hydrochlorothiazide 12.5 mg; take 1 capsule by mouth daily

## 2024-05-23 NOTE — PROGRESS NOTES
I reviewed the progress note and agree with the resident’s findings and plans as written. Case discussed with resident.    Yeni Lyons, PharmD

## 2024-05-23 NOTE — ASSESSMENT & PLAN NOTE
Patient has stage 3 CKD, her most recent eGFR from 3/18/2024 was 51 mL/min. Patient is not currently on any medications that need to be renally dose adjusted.  Patient is currently taking Jardiance 10 mg daily, she states that she is doing well on the medication and is not experiencing any adverse effects. She has an allergy to ACE inhibitors and ARBs, so she is not able to take those medications.      PLAN:  Continue  1. Jardiance 10 mg; take 1 tablet by mouth daily.   negative

## 2024-06-20 ENCOUNTER — APPOINTMENT (OUTPATIENT)
Dept: PHARMACY | Facility: HOSPITAL | Age: 79
End: 2024-06-20
Payer: MEDICARE

## 2024-06-20 DIAGNOSIS — N18.32 CHRONIC KIDNEY DISEASE, STAGE 3B (MULTI): ICD-10-CM

## 2024-06-20 DIAGNOSIS — I10 BENIGN ESSENTIAL HYPERTENSION: Primary | ICD-10-CM

## 2024-06-20 ASSESSMENT — ENCOUNTER SYMPTOMS: HYPERTENSION: 1

## 2024-06-20 NOTE — ASSESSMENT & PLAN NOTE
Patient has stage 3 CKD, her most recent eGFR from 3/18/2024 was 51 mL/min. Patient is not currently on any medications that need to be renally dose adjusted.  Patient is currently taking Jardiance 10 mg daily, she states that she is doing well on the medication and is not experiencing any adverse effects. She has an allergy to ACE inhibitors and ARBs, so she is not able to take those medications.      PLAN:  Continue  1.          Jardiance 10 mg; take 1 tablet by mouth daily.

## 2024-06-20 NOTE — PROGRESS NOTES
Subjective     Patient ID: Ely Mann is a 79 y.o. female who presents for Hypertension and Chronic Kidney Disease.    Referring Provider: Livia Jeronimo DO     Patient presents for follow up for CKD, stage 3. She is currently doing well on Jardiance 10 mg daily, but is unable to take an ACE or ARB due to allergies to the medications.    Hypertension  The problem is controlled. Past treatments include calcium channel blockers and diuretics.       Diet: Patient continues to try to eat healthy foods, has oatmeal in the mornings, eats a lot of fruits and vegetables throughout the day, sometimes sausage or chicken.     Exercise: Patient goes to the gym 3-4 times a week, and lifts weights.     Allergies   Allergen Reactions    Ace Inhibitors Unknown    Arb-Angiotensin Receptor Antagonist Unknown    Aspirin Unknown    Caffeine Unknown    Trolamine Salicylate Other       Objective     CKD ASSESSMENT    Renal Function  CKD Stage: 3  eGFR: 51 mL/min/1.73 m2 as of 3/18/2024  sCr: 1.09 mg/dL as of 3/18/2024    Hypertension  BP: controlled  Per chart review of vitals:  Systolic Diastolic Date   130 60 3/18/2024     Medications:   Amlodipine 10 mg daily  Hydrochlorothiazide 12.5 mg daily    Medications reviewed for appropriate dosing in setting of CKD  Recommended changes: none, all medications dosed appropriately for her current renal function    Lab Review  Lab Results   Component Value Date    BILITOT 0.7 09/25/2023    CALCIUM 9.5 03/18/2024    CO2 29 03/18/2024     03/18/2024    CREATININE 1.09 (H) 03/18/2024    GLUCOSE 100 (H) 03/18/2024    ALKPHOS 86 09/25/2023    K 4.3 03/18/2024    PROT 7.8 09/25/2023     03/18/2024    AST 25 09/25/2023    ALT 22 09/25/2023    BUN 16 03/18/2024    ANIONGAP 14 03/18/2024    PHOS 3.1 03/18/2024    ALBUMIN 4.7 03/18/2024    GFRF 48 (A) 09/25/2023     Lab Results   Component Value Date    TRIG 76 03/18/2024    CHOL 266 (H) 03/18/2024    LDLCALC 160 (H) 03/18/2024    HDL  "91.1 03/18/2024     No results found for: \"HGBA1C\"  The ASCVD Risk score (Jaime SAMUEL, et al., 2019) failed to calculate for the following reasons:    The 2019 ASCVD risk score is only valid for ages 40 to 79      Assessment/Plan     Problem List Items Addressed This Visit       Benign essential hypertension - Primary     Patient's BP is currently controlled, her most recent in office blood pressure from 3/18/2024 was 130/60. Patient does not currently monitor her BP at home.      PLAN:  Continue  1.          Amlodipine 10 mg; take 1 tablet by mouth daily  2.          Hydrochlorothiazide 12.5 mg; take 1 capsule by mouth daily         Relevant Orders    Follow Up In [facility] - Pharmacy    Chronic kidney disease, stage 3b (Multi)     Patient has stage 3 CKD, her most recent eGFR from 3/18/2024 was 51 mL/min. Patient is not currently on any medications that need to be renally dose adjusted.  Patient is currently taking Jardiance 10 mg daily, she states that she is doing well on the medication and is not experiencing any adverse effects. She has an allergy to ACE inhibitors and ARBs, so she is not able to take those medications.      PLAN:  Continue  1.          Jardiance 10 mg; take 1 tablet by mouth daily.         Relevant Orders    Follow Up In [facility] - Pharmacy     Recommend follow up in 12 weeks.    Barbie Crook, Pharm. D.  PGY-1 Pharmacy Resident    Continue all meds under the continuation of care with the referring provider and clinical pharmacy team  "

## 2024-07-12 ENCOUNTER — APPOINTMENT (OUTPATIENT)
Dept: PRIMARY CARE | Facility: CLINIC | Age: 79
End: 2024-07-12
Payer: MEDICARE

## 2024-07-12 ENCOUNTER — LAB (OUTPATIENT)
Dept: LAB | Facility: LAB | Age: 79
End: 2024-07-12
Payer: MEDICARE

## 2024-07-12 VITALS
HEIGHT: 63 IN | TEMPERATURE: 97.6 F | BODY MASS INDEX: 30.88 KG/M2 | OXYGEN SATURATION: 97 % | DIASTOLIC BLOOD PRESSURE: 62 MMHG | HEART RATE: 75 BPM | WEIGHT: 174.3 LBS | RESPIRATION RATE: 15 BRPM | SYSTOLIC BLOOD PRESSURE: 124 MMHG

## 2024-07-12 DIAGNOSIS — I10 BENIGN ESSENTIAL HYPERTENSION: ICD-10-CM

## 2024-07-12 DIAGNOSIS — E78.5 HYPERLIPIDEMIA, UNSPECIFIED HYPERLIPIDEMIA TYPE: Primary | ICD-10-CM

## 2024-07-12 DIAGNOSIS — E78.5 HYPERLIPIDEMIA, UNSPECIFIED HYPERLIPIDEMIA TYPE: ICD-10-CM

## 2024-07-12 DIAGNOSIS — N18.32 CHRONIC KIDNEY DISEASE, STAGE 3B (MULTI): ICD-10-CM

## 2024-07-12 PROBLEM — J32.9 CHRONIC SINUSITIS: Status: ACTIVE | Noted: 2024-07-12

## 2024-07-12 PROBLEM — G47.00 INSOMNIA: Status: ACTIVE | Noted: 2024-07-12

## 2024-07-12 PROBLEM — E66.9 OBESITY DUE TO ENERGY IMBALANCE: Status: RESOLVED | Noted: 2024-07-12 | Resolved: 2024-07-12

## 2024-07-12 PROBLEM — M79.673 PAIN OF FOOT: Status: ACTIVE | Noted: 2024-07-12

## 2024-07-12 PROBLEM — M76.60 ACHILLES TENDINITIS: Status: ACTIVE | Noted: 2024-07-12

## 2024-07-12 PROBLEM — E66.9 OBESITY DUE TO ENERGY IMBALANCE: Status: ACTIVE | Noted: 2024-07-12

## 2024-07-12 LAB
ALBUMIN SERPL BCP-MCNC: 4.2 G/DL (ref 3.4–5)
ALP SERPL-CCNC: 79 U/L (ref 33–136)
ALT SERPL W P-5'-P-CCNC: 15 U/L (ref 7–45)
ANION GAP SERPL CALC-SCNC: 12 MMOL/L (ref 10–20)
APPEARANCE UR: CLEAR
AST SERPL W P-5'-P-CCNC: 19 U/L (ref 9–39)
BASOPHILS # BLD AUTO: 0.04 X10*3/UL (ref 0–0.1)
BASOPHILS NFR BLD AUTO: 0.6 %
BILIRUB SERPL-MCNC: 0.6 MG/DL (ref 0–1.2)
BILIRUB UR STRIP.AUTO-MCNC: NEGATIVE MG/DL
BUN SERPL-MCNC: 26 MG/DL (ref 6–23)
CALCIUM SERPL-MCNC: 9.3 MG/DL (ref 8.6–10.6)
CHLORIDE SERPL-SCNC: 104 MMOL/L (ref 98–107)
CHOLEST SERPL-MCNC: 231 MG/DL (ref 0–199)
CHOLESTEROL/HDL RATIO: 2.9
CO2 SERPL-SCNC: 30 MMOL/L (ref 21–32)
COLOR UR: COLORLESS
CREAT SERPL-MCNC: 1.3 MG/DL (ref 0.5–1.05)
CREAT UR-MCNC: 50.1 MG/DL (ref 20–320)
EGFRCR SERPLBLD CKD-EPI 2021: 42 ML/MIN/1.73M*2
EOSINOPHIL # BLD AUTO: 0.06 X10*3/UL (ref 0–0.4)
EOSINOPHIL NFR BLD AUTO: 1 %
ERYTHROCYTE [DISTWIDTH] IN BLOOD BY AUTOMATED COUNT: 14.4 % (ref 11.5–14.5)
GLUCOSE SERPL-MCNC: 104 MG/DL (ref 74–99)
GLUCOSE UR STRIP.AUTO-MCNC: ABNORMAL MG/DL
HCT VFR BLD AUTO: 39.2 % (ref 36–46)
HDLC SERPL-MCNC: 80.9 MG/DL
HGB BLD-MCNC: 12.6 G/DL (ref 12–16)
IMM GRANULOCYTES # BLD AUTO: 0.03 X10*3/UL (ref 0–0.5)
IMM GRANULOCYTES NFR BLD AUTO: 0.5 % (ref 0–0.9)
KETONES UR STRIP.AUTO-MCNC: NEGATIVE MG/DL
LDLC SERPL CALC-MCNC: 138 MG/DL
LEUKOCYTE ESTERASE UR QL STRIP.AUTO: NEGATIVE
LYMPHOCYTES # BLD AUTO: 1.73 X10*3/UL (ref 0.8–3)
LYMPHOCYTES NFR BLD AUTO: 27.9 %
MCH RBC QN AUTO: 30.8 PG (ref 26–34)
MCHC RBC AUTO-ENTMCNC: 32.1 G/DL (ref 32–36)
MCV RBC AUTO: 96 FL (ref 80–100)
MICROALBUMIN UR-MCNC: 10.5 MG/L
MICROALBUMIN/CREAT UR: 21 UG/MG CREAT
MONOCYTES # BLD AUTO: 0.43 X10*3/UL (ref 0.05–0.8)
MONOCYTES NFR BLD AUTO: 6.9 %
NEUTROPHILS # BLD AUTO: 3.9 X10*3/UL (ref 1.6–5.5)
NEUTROPHILS NFR BLD AUTO: 63.1 %
NITRITE UR QL STRIP.AUTO: NEGATIVE
NON HDL CHOLESTEROL: 150 MG/DL (ref 0–149)
NRBC BLD-RTO: 0 /100 WBCS (ref 0–0)
PH UR STRIP.AUTO: 6 [PH]
PLATELET # BLD AUTO: 242 X10*3/UL (ref 150–450)
POTASSIUM SERPL-SCNC: 4.2 MMOL/L (ref 3.5–5.3)
PROT SERPL-MCNC: 7.4 G/DL (ref 6.4–8.2)
PROT UR STRIP.AUTO-MCNC: NEGATIVE MG/DL
RBC # BLD AUTO: 4.09 X10*6/UL (ref 4–5.2)
RBC # UR STRIP.AUTO: NEGATIVE /UL
SODIUM SERPL-SCNC: 142 MMOL/L (ref 136–145)
SP GR UR STRIP.AUTO: 1.01
TRIGL SERPL-MCNC: 61 MG/DL (ref 0–149)
TSH SERPL-ACNC: 0.94 MIU/L (ref 0.44–3.98)
UROBILINOGEN UR STRIP.AUTO-MCNC: NORMAL MG/DL
VLDL: 12 MG/DL (ref 0–40)
WBC # BLD AUTO: 6.2 X10*3/UL (ref 4.4–11.3)

## 2024-07-12 PROCEDURE — 82570 ASSAY OF URINE CREATININE: CPT

## 2024-07-12 PROCEDURE — 84443 ASSAY THYROID STIM HORMONE: CPT

## 2024-07-12 PROCEDURE — 80061 LIPID PANEL: CPT

## 2024-07-12 PROCEDURE — 1160F RVW MEDS BY RX/DR IN RCRD: CPT | Performed by: FAMILY MEDICINE

## 2024-07-12 PROCEDURE — 3078F DIAST BP <80 MM HG: CPT | Performed by: FAMILY MEDICINE

## 2024-07-12 PROCEDURE — 1159F MED LIST DOCD IN RCRD: CPT | Performed by: FAMILY MEDICINE

## 2024-07-12 PROCEDURE — 85025 COMPLETE CBC W/AUTO DIFF WBC: CPT

## 2024-07-12 PROCEDURE — 80053 COMPREHEN METABOLIC PANEL: CPT

## 2024-07-12 PROCEDURE — 81003 URINALYSIS AUTO W/O SCOPE: CPT

## 2024-07-12 PROCEDURE — 82043 UR ALBUMIN QUANTITATIVE: CPT

## 2024-07-12 PROCEDURE — 36415 COLL VENOUS BLD VENIPUNCTURE: CPT

## 2024-07-12 PROCEDURE — 3074F SYST BP LT 130 MM HG: CPT | Performed by: FAMILY MEDICINE

## 2024-07-12 PROCEDURE — 99213 OFFICE O/P EST LOW 20 MIN: CPT | Performed by: FAMILY MEDICINE

## 2024-07-12 PROCEDURE — 1036F TOBACCO NON-USER: CPT | Performed by: FAMILY MEDICINE

## 2024-07-12 ASSESSMENT — PATIENT HEALTH QUESTIONNAIRE - PHQ9
1. LITTLE INTEREST OR PLEASURE IN DOING THINGS: NOT AT ALL
SUM OF ALL RESPONSES TO PHQ9 QUESTIONS 1 AND 2: 0
2. FEELING DOWN, DEPRESSED OR HOPELESS: NOT AT ALL

## 2024-07-12 NOTE — PATIENT INSTRUCTIONS
RSV- can get from your local pharm    Labs today    HTN  - Your blood pressure is at goal today- goal is less than 130/80  - Continue your current medications  - Weight loss can help lower your bp!  Work on a healthy whole food diet and add at least 30min of exercise 5 days per week  - Work on a low salt diet       pharm number to call for mail order-  P: [phone]     BARAK  - continue with CPAP    Please follow up in  as scheduled in SEPT or as needed.       ** If labs or imaging ordered at today's visit, all the non-urgent results will be discussed at your next visit    If you have been referred for a special test or to a specialist please call  [phone] to schedule an appointment.  If you have any further questions, or if develop new or worsened symptoms, please give our office a call at [phone].

## 2024-07-12 NOTE — PROGRESS NOTES
"Subjective   Patient ID: Ely Mann is a 79 y.o. female who presents for Follow-up (Pt is here to follow-up for HTN.).    HPI     HTN  BP at goal today in office.  Using medications without issues.  Denies CP, SOB, palpitations, change in vision, dizziness, N/V.    BARAK  Using CPAP  Doing well with using    Review of Systems  All systems reviewed and neg if not noted in the HPI above     Objective   /62   Pulse 75   Temp 36.4 °C (97.6 °F)   Resp 15   Ht 1.6 m (5' 3\")   Wt 79.1 kg (174 lb 4.8 oz)   SpO2 97%   BMI 30.88 kg/m²     Physical Exam  Pleasant  Eyes: conjunctiva non-icteric and eye lids are without obvious rash or drooping. Pupils are symmetric.   Ears, Nose, Mouth, and Throat: External ears and nose appear to be without deformity or rash. No lesions or masses noted. Hearing is grossly intact.   CV: RRR, no murmur  Carotids: no bruits  Pulm:CTA B/L  Abd: soft, NTTP, + BS  LE: no edema  Psychiatric: Alert, orientation to person, place, and time. Recent/remote memory as evidenced through face-to-face interaction and discussion appear grossly intact. Mood and affect are normal.        Assessment/Plan   Problem List Items Addressed This Visit             ICD-10-CM    Benign essential hypertension I10    Relevant Orders    CBC and Auto Differential    Lipid Panel    Comprehensive Metabolic Panel    TSH with reflex to Free T4 if abnormal    Albumin-Creatinine Ratio, Urine Random    Urinalysis with Reflex Microscopic    Chronic kidney disease, stage 3b (Multi) N18.32    Relevant Orders    CBC and Auto Differential    Lipid Panel    Comprehensive Metabolic Panel    TSH with reflex to Free T4 if abnormal    Albumin-Creatinine Ratio, Urine Random    Urinalysis with Reflex Microscopic    HLD (hyperlipidemia) - Primary E78.5    Relevant Orders    CBC and Auto Differential    Lipid Panel    Comprehensive Metabolic Panel    TSH with reflex to Free T4 if abnormal    Albumin-Creatinine Ratio, Urine Random    " Urinalysis with Reflex Microscopic              Please follow up in  as scheduled in SEPT or as needed.

## 2024-07-14 DIAGNOSIS — N18.32 CHRONIC KIDNEY DISEASE, STAGE 3B (MULTI): Primary | ICD-10-CM

## 2024-08-02 PROCEDURE — RXMED WILLOW AMBULATORY MEDICATION CHARGE

## 2024-08-06 ENCOUNTER — PHARMACY VISIT (OUTPATIENT)
Dept: PHARMACY | Facility: CLINIC | Age: 79
End: 2024-08-06
Payer: COMMERCIAL

## 2024-09-03 ENCOUNTER — HOSPITAL ENCOUNTER (OUTPATIENT)
Dept: RADIOLOGY | Facility: CLINIC | Age: 79
Discharge: HOME | End: 2024-09-03
Payer: MEDICARE

## 2024-09-03 VITALS — HEIGHT: 63 IN | BODY MASS INDEX: 30.9 KG/M2 | WEIGHT: 174.38 LBS

## 2024-09-03 DIAGNOSIS — Z12.31 ENCOUNTER FOR SCREENING MAMMOGRAM FOR BREAST CANCER: ICD-10-CM

## 2024-09-03 PROCEDURE — 77067 SCR MAMMO BI INCL CAD: CPT

## 2024-09-03 PROCEDURE — 77063 BREAST TOMOSYNTHESIS BI: CPT | Performed by: RADIOLOGY

## 2024-09-03 PROCEDURE — 77067 SCR MAMMO BI INCL CAD: CPT | Performed by: RADIOLOGY

## 2024-09-18 NOTE — PROGRESS NOTES
Clinical Pharmacy Appointment    Patient ID: Ely Mann is a 79 y.o. female who presents for Chronic Kidney Disease and Hypertension.    Pt is here for Follow Up appointment.     Referring Provider: Livia Jeronimo DO  PCP: Livia Jeronimo DO   Last visit with PCP: 7/12/2024   Next visit with PCP: 9/26/2024      Subjective   HPI  HYPERTENSION ASSESSMENT    Medication Therapy  Current Medications:   Amlodipine 10 mg; take 1 tablet daily  Hydrochlorothiazide 12.5 mg; take 1 capsule daily     Adverse Effects: None     Home BP Monitoring  BP Cuff Type: None  Cuff Validated? No    Patient does not check her BP at home. Most recent in office BP was 124/62.    BP Readings from Last 3 Encounters:   07/12/24 124/62   03/18/24 130/60   09/25/23 112/60       Lifestyle  Diet: Patient tries to eat healthy, she has oatmeal in the mornings, eats a lot of fruits and vegetables throughout the day, sometimes sausage or chicken.  Physical Activity: Patient goes to the gym 3-4 times a week, and lifts weights.    Sodium Intake:  salt not added to cooking and salt shaker not on table    Risk Assessment  Major Risk Factors Include: Hypertension  Dyslipidemia  Microalbuminuria or eGFR < 60  Age > 55 (men) or > 65 (women)  Sleep apnea  Known target organ damage: Retinopathy    Secondary Prevention  ASCVD Risk:   The ASCVD Risk score (Jaime SAMUEL, et al., 2019) failed to calculate for the following reasons:    The 2019 ASCVD risk score is only valid for ages 40 to 79  On Statin?: No  Immunizations Needed: Stated up-to-date, not documented  Tobacco Use: non-smoker     CHRONIC KIDNEY DISEASE ASSESSMENT  Does patient see nephrology? No    Current medications include:  Jardiance 10 mg; take 1 tablet daily    Lab Results   Component Value Date    EGFR 42 (L) 07/12/2024     Stage: 3b (eGFR 30-44)  Albuminuria: A1 (<30 mg/g)  ACE/ARB: No, patient is unable to take ACE/ARB because of allergies.     Medication Review  Current medications and  doses were reviewed and are appropriate per current kidney function.  The following medications increase risk of KAYLIN and should be closely monitored:  Diuretics (hydrochlorothiazide)    Hypertension History:  HTN diagnosis: yes  Current Regimen  Amlodipine 10 mg; take 1 tablet daily  Hydrochlorothiazide 12.5 mg; take 1 capsule daily  HTN at goal? yes  BP Cuff at home? no    Hyperlipidemia History:  Diagnosis? yes  Current Regimen  None  At goal? no  Current LDL: 138 mg/dL  Current T mg/dL    Diabetes History:  Diagnosis? no    Lab Review  Electrolytes: Within normal limits (2024)  Alk phos: Within normal limits (2024)  Sodium bicarb: Within normal limits (2024)  PTH: Need updated labs, last 2023  Hgb: Within normal limits (2024)     Drug Interactions  No relevant drug interactions were noted.    Medication System Management  Patient's preferred pharmacy:  [facility]  Adherence/Organization: Takes medications as directed  Affordability/Accessibility: Jardiance approved on  PAP until 2025.      Objective   Allergies   Allergen Reactions    Ace Inhibitors Unknown    Arb-Angiotensin Receptor Antagonist Unknown    Aspirin Unknown    Caffeine Unknown    Trolamine Salicylate Other     Social History     Social History Narrative    Not on file      Medication Review  Current Outpatient Medications   Medication Instructions    amLODIPine (NORVASC) 10 mg, oral, Daily    B complex-vitamin C-folic acid (Nephrocaps) 1 mg capsule 1 capsule, oral, Daily    biotin 10 mg tablet 1 tablet, oral, Daily    cholecalciferol (Vitamin D-3) 50 mcg (2,000 unit) capsule 1 capsule, oral, Daily    hydroCHLOROthiazide (MICROZIDE) 12.5 mg, oral, Daily    Jardiance 10 mg, oral, Daily    omeprazole (PriLOSEC) 20 mg DR capsule TAKE 1 CAPSULE BY MOUTH DAILY AS NEEDED FOR GASTROESOPHAGEAL  REFLUX DISEASE      Vitals  BP Readings from Last 2 Encounters:   24 124/62   24 130/60  "    BMI Readings from Last 1 Encounters:   09/03/24 30.90 kg/m²      Labs  A1C  No results found for: \"HGBA1C\"  BMP  Lab Results   Component Value Date    CALCIUM 9.3 07/12/2024     07/12/2024    K 4.2 07/12/2024    CO2 30 07/12/2024     07/12/2024    BUN 26 (H) 07/12/2024    CREATININE 1.30 (H) 07/12/2024    EGFR 42 (L) 07/12/2024     LFTs  Lab Results   Component Value Date    ALT 15 07/12/2024    AST 19 07/12/2024    ALKPHOS 79 07/12/2024    BILITOT 0.6 07/12/2024     FLP  Lab Results   Component Value Date    TRIG 61 07/12/2024    CHOL 231 (H) 07/12/2024    LDLF 143 (H) 02/21/2023    LDLCALC 138 (H) 07/12/2024    HDL 80.9 07/12/2024     Urine Microalbumin  Lab Results   Component Value Date    MICROALBCREA 21.0 07/12/2024     Weight Management  Wt Readings from Last 3 Encounters:   09/03/24 79.1 kg (174 lb 6.1 oz)   07/12/24 79.1 kg (174 lb 4.8 oz)   03/18/24 81 kg (178 lb 8 oz)      There is no height or weight on file to calculate BMI.     Assessment/Plan   Problem List Items Addressed This Visit       Benign essential hypertension     ASSESSMENT OF SMBP MEASUREMENTS  Patient has not been monitoring BP at home.  Most recent in office BP from 7/12/2024 was 124/62.    Patient's goal BP < 130/80.   Rationale for plan: Patient's most recent in office BP was at goal, so she will continue with her current regimen.    CrCl cannot be calculated (Patient's most recent lab result is older than the maximum 3 days allowed.).    Medication Changes:  CONTINUE  Amlodipine 10 mg; take 1 tablet daily  Hydrochlorothiazide 12.5 mg; take 1 capsule daily    Future Considerations:  Continue to monitor patient's BP to ensure that she stays controlled.    Monitoring and Education Discussed:  Eat right: Your diet should be rich in fruits, vegetables, potassium, and low-fat dairy products. You should also reduce your intake of sodium and fats, particularly saturated fats.  Maintain a healthy weight: Try to achieve and " maintain a healthy weight. If you are unsure what this means for you, please contact our clinic.  Exercise: Try to get at least 30 minutes of aerobic exercise every day.  Moderate your alcohol consumption: Limit your alcohol intake to one drink per day.  Monitor your blood pressure: You should check your blood pressure regularly. Notify our clinic if your blood pressure readings are consistently higher than recommended.          Chronic kidney disease, stage 3b (Multi)     ASSESSMENT OF CHRONIC KIDNEY DISEASE  Patient's CKD is stable  Rationale for plan: Patient will continue to take Jardiance. She is unable to take an ACE/ARB due to allergies.    Medication Changes:  CONTINUE  Jardiance 10 mg; take 1 tablet daily    Monitoring and Education Discussed:  Reviewed CKD lab results and benefits of good hydration  Discussed avoidance of NSAIDs, and use of Tylenol for headaches/pain  Recommended need for good control of blood pressure and blood glucose  Advised to report any changes in fluid retention, weight or decreased urinary output  Counseled patient on MOA, expectations, duration of therapy, contraindications, administration, and monitoring parameters   Answered all patient questions and concerns            Clinical Pharmacist follow-up: 12/12/2024, Telehealth visit    Continue all meds under the continuation of care with the referring provider and clinical pharmacy team.    Thank you,  Barbie Crook, PharmD  Clinical Pharmacist - Primary Care  [phone]      Verbal consent to manage patient's drug therapy was obtained from the patient. They were informed they may decline to participate or withdraw from participation in pharmacy services at any time.

## 2024-09-19 ENCOUNTER — APPOINTMENT (OUTPATIENT)
Dept: PHARMACY | Facility: HOSPITAL | Age: 79
End: 2024-09-19
Payer: MEDICARE

## 2024-09-19 DIAGNOSIS — N18.32 CHRONIC KIDNEY DISEASE, STAGE 3B (MULTI): ICD-10-CM

## 2024-09-19 DIAGNOSIS — I10 BENIGN ESSENTIAL HYPERTENSION: ICD-10-CM

## 2024-09-19 NOTE — ASSESSMENT & PLAN NOTE
ASSESSMENT OF CHRONIC KIDNEY DISEASE  Patient's CKD is stable  Rationale for plan: Patient will continue to take Jardiance. She is unable to take an ACE/ARB due to allergies.    Medication Changes:  CONTINUE  Jardiance 10 mg; take 1 tablet daily    Monitoring and Education Discussed:  Reviewed CKD lab results and benefits of good hydration  Discussed avoidance of NSAIDs, and use of Tylenol for headaches/pain  Recommended need for good control of blood pressure and blood glucose  Advised to report any changes in fluid retention, weight or decreased urinary output  Counseled patient on MOA, expectations, duration of therapy, contraindications, administration, and monitoring parameters   Answered all patient questions and concerns

## 2024-09-19 NOTE — ASSESSMENT & PLAN NOTE
ASSESSMENT OF SMBP MEASUREMENTS  Patient has not been monitoring BP at home.  Most recent in office BP from 7/12/2024 was 124/62.    Patient's goal BP < 130/80.   Rationale for plan: Patient's most recent in office BP was at goal, so she will continue with her current regimen.    CrCl cannot be calculated (Patient's most recent lab result is older than the maximum 3 days allowed.).    Medication Changes:  CONTINUE  Amlodipine 10 mg; take 1 tablet daily  Hydrochlorothiazide 12.5 mg; take 1 capsule daily    Future Considerations:  Continue to monitor patient's BP to ensure that she stays controlled.    Monitoring and Education Discussed:  Eat right: Your diet should be rich in fruits, vegetables, potassium, and low-fat dairy products. You should also reduce your intake of sodium and fats, particularly saturated fats.  Maintain a healthy weight: Try to achieve and maintain a healthy weight. If you are unsure what this means for you, please contact our clinic.  Exercise: Try to get at least 30 minutes of aerobic exercise every day.  Moderate your alcohol consumption: Limit your alcohol intake to one drink per day.  Monitor your blood pressure: You should check your blood pressure regularly. Notify our clinic if your blood pressure readings are consistently higher than recommended.

## 2024-09-25 DIAGNOSIS — I10 HYPERTENSION, UNSPECIFIED TYPE: ICD-10-CM

## 2024-09-26 ENCOUNTER — LAB (OUTPATIENT)
Dept: LAB | Facility: LAB | Age: 79
End: 2024-09-26
Payer: MEDICARE

## 2024-09-26 ENCOUNTER — APPOINTMENT (OUTPATIENT)
Dept: PRIMARY CARE | Facility: CLINIC | Age: 79
End: 2024-09-26
Payer: MEDICARE

## 2024-09-26 VITALS
HEART RATE: 66 BPM | WEIGHT: 173.1 LBS | HEIGHT: 63 IN | DIASTOLIC BLOOD PRESSURE: 66 MMHG | SYSTOLIC BLOOD PRESSURE: 108 MMHG | TEMPERATURE: 97 F | BODY MASS INDEX: 30.67 KG/M2 | OXYGEN SATURATION: 98 % | RESPIRATION RATE: 14 BRPM

## 2024-09-26 DIAGNOSIS — Z13.89 ENCOUNTER FOR SCREENING FOR OTHER DISORDER: ICD-10-CM

## 2024-09-26 DIAGNOSIS — Z00.00 MEDICARE ANNUAL WELLNESS VISIT, SUBSEQUENT: Primary | ICD-10-CM

## 2024-09-26 DIAGNOSIS — N18.32 CHRONIC KIDNEY DISEASE, STAGE 3B (MULTI): ICD-10-CM

## 2024-09-26 DIAGNOSIS — E66.09 CLASS 1 OBESITY DUE TO EXCESS CALORIES WITH SERIOUS COMORBIDITY AND BODY MASS INDEX (BMI) OF 30.0 TO 30.9 IN ADULT: ICD-10-CM

## 2024-09-26 DIAGNOSIS — I10 BENIGN ESSENTIAL HYPERTENSION: ICD-10-CM

## 2024-09-26 LAB
ANION GAP SERPL CALC-SCNC: 13 MMOL/L (ref 10–20)
BUN SERPL-MCNC: 18 MG/DL (ref 6–23)
CALCIUM SERPL-MCNC: 9.4 MG/DL (ref 8.6–10.6)
CHLORIDE SERPL-SCNC: 100 MMOL/L (ref 98–107)
CO2 SERPL-SCNC: 30 MMOL/L (ref 21–32)
CREAT SERPL-MCNC: 1.24 MG/DL (ref 0.5–1.05)
EGFRCR SERPLBLD CKD-EPI 2021: 44 ML/MIN/1.73M*2
GLUCOSE SERPL-MCNC: 98 MG/DL (ref 74–99)
POTASSIUM SERPL-SCNC: 3.9 MMOL/L (ref 3.5–5.3)
SODIUM SERPL-SCNC: 139 MMOL/L (ref 136–145)

## 2024-09-26 PROCEDURE — 1158F ADVNC CARE PLAN TLK DOCD: CPT | Performed by: FAMILY MEDICINE

## 2024-09-26 PROCEDURE — 80048 BASIC METABOLIC PNL TOTAL CA: CPT

## 2024-09-26 PROCEDURE — G0444 DEPRESSION SCREEN ANNUAL: HCPCS | Performed by: FAMILY MEDICINE

## 2024-09-26 PROCEDURE — 36415 COLL VENOUS BLD VENIPUNCTURE: CPT

## 2024-09-26 PROCEDURE — 1123F ACP DISCUSS/DSCN MKR DOCD: CPT | Performed by: FAMILY MEDICINE

## 2024-09-26 PROCEDURE — 1170F FXNL STATUS ASSESSED: CPT | Performed by: FAMILY MEDICINE

## 2024-09-26 PROCEDURE — 1160F RVW MEDS BY RX/DR IN RCRD: CPT | Performed by: FAMILY MEDICINE

## 2024-09-26 PROCEDURE — G0447 BEHAVIOR COUNSEL OBESITY 15M: HCPCS | Performed by: FAMILY MEDICINE

## 2024-09-26 PROCEDURE — 3074F SYST BP LT 130 MM HG: CPT | Performed by: FAMILY MEDICINE

## 2024-09-26 PROCEDURE — G0446 INTENS BEHAVE THER CARDIO DX: HCPCS | Performed by: FAMILY MEDICINE

## 2024-09-26 PROCEDURE — 99397 PER PM REEVAL EST PAT 65+ YR: CPT | Performed by: FAMILY MEDICINE

## 2024-09-26 PROCEDURE — G0439 PPPS, SUBSEQ VISIT: HCPCS | Performed by: FAMILY MEDICINE

## 2024-09-26 PROCEDURE — 1159F MED LIST DOCD IN RCRD: CPT | Performed by: FAMILY MEDICINE

## 2024-09-26 PROCEDURE — 3078F DIAST BP <80 MM HG: CPT | Performed by: FAMILY MEDICINE

## 2024-09-26 PROCEDURE — 1036F TOBACCO NON-USER: CPT | Performed by: FAMILY MEDICINE

## 2024-09-26 RX ORDER — AMLODIPINE BESYLATE 10 MG/1
10 TABLET ORAL DAILY
Qty: 90 TABLET | Refills: 3 | Status: SHIPPED | OUTPATIENT
Start: 2024-09-26

## 2024-09-26 ASSESSMENT — ACTIVITIES OF DAILY LIVING (ADL)
MANAGING_FINANCES: INDEPENDENT
GROCERY_SHOPPING: INDEPENDENT
BATHING: INDEPENDENT
TAKING_MEDICATION: INDEPENDENT
DRESSING: INDEPENDENT
DOING_HOUSEWORK: INDEPENDENT

## 2024-09-26 ASSESSMENT — PATIENT HEALTH QUESTIONNAIRE - PHQ9
SUM OF ALL RESPONSES TO PHQ9 QUESTIONS 1 AND 2: 0
1. LITTLE INTEREST OR PLEASURE IN DOING THINGS: NOT AT ALL
2. FEELING DOWN, DEPRESSED OR HOPELESS: NOT AT ALL

## 2024-09-26 NOTE — PROGRESS NOTES
"Subjective   Reason for Visit: Ely Mann is an 79 y.o. female here for a Medicare Wellness visit.               HPI    Patient Care Team:  Livia Jeronimo DO as PCP - General  Annamaria CarterD as Pharmacist (Pharmacy)     HTN  BP at goal today in office.  Using medications without issues.  Denies CP, SOB, palpitations, change in vision, dizziness, N/V.    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 Pupils are symmetric.   Ears, Nose, Mouth, and Throat: External ears and nose appear to be without deformity or rash. No lesions or masses noted. Hearing is grossly intact.   CV: RRR, no murmur  Carotids: no bruits  Pulm:CTA B/L  Abd: soft, NTTP, + BS  LE: no edema  Psychiatric: Alert, orientation to person, place, and time. Recent/remote memory as evidenced through face-to-face interaction and discussion appear grossly intact. Mood and affect are normal.      Assessment & Plan  Medicare annual wellness visit, subsequent         Encounter for screening for other disorder         Class 1 obesity due to excess calories with serious comorbidity and body mass index (BMI) of 30.0 to 30.9 in adult         Benign essential hypertension                Cardiac Risk Assessment  15 - 20 minutes were spent discussing Cardiovascular risk and, if needed, lifestyle modifications were recommended, including nutritional choices, exercise, and elimination of habits contributing to risk.   Aspirin use/disuse was discussed following the guidelines below:  low dose ASA ( mg) should be considered:    If prior Heart Attack/Stroke/Peripheral vascular " disease:  Generally recommend daily low dose aspirin unless extremely high bleeding risk (e.g., gastrointestinal).    If no prior Heart Attack/Stroke/Peripheral vascular disease:              Age over 70: Do not use Aspirin for prevention    Age less than 70 and 10-year cardiovascular disease risk is >20%: use low dose Aspirin for prevention.                 Depression Screening  5 - 10 minutes were spent screening for depression.    Obesity Counseling  15 - 20 minutes were spent counseling on diet and exercise interventions to address obesity and weight reduction.            Please follow up in   -6months for HTN and   -1 yr for medicare wellness   -or as needed.

## 2024-09-26 NOTE — PATIENT INSTRUCTIONS
Recheck the kidney labs today    HTN  - Your blood pressure is at goal today- goal is less than 130/80  - Continue your current medications  - Weight loss can help lower your bp!  Work on a healthy whole food diet and add at least 30min of exercise 5 days per week  - Work on a low salt diet      Please follow up in   -6months for HTN and   -1 yr for medicare wellness   -or as needed.       ** If labs or imaging ordered at today's visit, all the non-urgent results will be discussed at your next visit    If you have been referred for a special test or to a specialist please call  [phone] to schedule an appointment.  If you have any further questions, or if develop new or worsened symptoms, please give our office a call at [phone].

## 2024-09-27 DIAGNOSIS — K21.9 GASTROESOPHAGEAL REFLUX DISEASE WITHOUT ESOPHAGITIS: ICD-10-CM

## 2024-10-03 RX ORDER — OMEPRAZOLE 20 MG/1
CAPSULE, DELAYED RELEASE ORAL
Qty: 90 CAPSULE | Refills: 3 | Status: SHIPPED | OUTPATIENT
Start: 2024-10-03

## 2024-10-14 ENCOUNTER — APPOINTMENT (OUTPATIENT)
Dept: SLEEP MEDICINE | Facility: CLINIC | Age: 79
End: 2024-10-14
Payer: MEDICARE

## 2024-10-14 VITALS
WEIGHT: 173.6 LBS | DIASTOLIC BLOOD PRESSURE: 78 MMHG | HEART RATE: 82 BPM | SYSTOLIC BLOOD PRESSURE: 147 MMHG | HEIGHT: 63 IN | BODY MASS INDEX: 30.76 KG/M2

## 2024-10-14 DIAGNOSIS — I10 BENIGN ESSENTIAL HYPERTENSION: ICD-10-CM

## 2024-10-14 DIAGNOSIS — G47.33 OBSTRUCTIVE SLEEP APNEA: Primary | ICD-10-CM

## 2024-10-14 DIAGNOSIS — G47.33 OSA (OBSTRUCTIVE SLEEP APNEA): ICD-10-CM

## 2024-10-14 DIAGNOSIS — Z72.821 INADEQUATE SLEEP HYGIENE: ICD-10-CM

## 2024-10-14 PROCEDURE — 1123F ACP DISCUSS/DSCN MKR DOCD: CPT | Performed by: STUDENT IN AN ORGANIZED HEALTH CARE EDUCATION/TRAINING PROGRAM

## 2024-10-14 PROCEDURE — 3078F DIAST BP <80 MM HG: CPT | Performed by: STUDENT IN AN ORGANIZED HEALTH CARE EDUCATION/TRAINING PROGRAM

## 2024-10-14 PROCEDURE — 3077F SYST BP >= 140 MM HG: CPT | Performed by: STUDENT IN AN ORGANIZED HEALTH CARE EDUCATION/TRAINING PROGRAM

## 2024-10-14 PROCEDURE — 1159F MED LIST DOCD IN RCRD: CPT | Performed by: STUDENT IN AN ORGANIZED HEALTH CARE EDUCATION/TRAINING PROGRAM

## 2024-10-14 PROCEDURE — 1036F TOBACCO NON-USER: CPT | Performed by: STUDENT IN AN ORGANIZED HEALTH CARE EDUCATION/TRAINING PROGRAM

## 2024-10-14 PROCEDURE — 99214 OFFICE O/P EST MOD 30 MIN: CPT | Performed by: STUDENT IN AN ORGANIZED HEALTH CARE EDUCATION/TRAINING PROGRAM

## 2024-10-14 PROCEDURE — G2211 COMPLEX E/M VISIT ADD ON: HCPCS | Performed by: STUDENT IN AN ORGANIZED HEALTH CARE EDUCATION/TRAINING PROGRAM

## 2024-10-14 PROCEDURE — 1160F RVW MEDS BY RX/DR IN RCRD: CPT | Performed by: STUDENT IN AN ORGANIZED HEALTH CARE EDUCATION/TRAINING PROGRAM

## 2024-10-14 ASSESSMENT — SLEEP AND FATIGUE QUESTIONNAIRES
HOW LIKELY ARE YOU TO NOD OFF OR FALL ASLEEP WHILE SITTING AND TALKING TO SOMEONE: WOULD NEVER DOZE
WORRIED_DISTRESSED_DUE_TO_SLEEP: NOT AT ALL NOTICEABLE
HOW LIKELY ARE YOU TO NOD OFF OR FALL ASLEEP WHILE SITTING QUIETLY AFTER LUNCH WITHOUT ALCOHOL: WOULD NEVER DOZE
SITING INACTIVE IN A PUBLIC PLACE LIKE A CLASS ROOM OR A MOVIE THEATER: WOULD NEVER DOZE
HOW LIKELY ARE YOU TO NOD OFF OR FALL ASLEEP WHILE WATCHING TV: WOULD NEVER DOZE
SLEEP_PROBLEM_INTERFERES_DAILY_ACTIVITIES: NOT AT ALL NOTICEABLE
SATISFACTION_WITH_CURRENT_SLEEP_PATTERN: VERY SATISFIED
HOW LIKELY ARE YOU TO NOD OFF OR FALL ASLEEP WHILE LYING DOWN TO REST IN THE AFTERNOON WHEN CIRCUMSTANCES PERMIT: WOULD NEVER DOZE
ESS-CHAD TOTAL SCORE: 0
HOW LIKELY ARE YOU TO NOD OFF OR FALL ASLEEP IN A CAR, WHILE STOPPED FOR A FEW MINUTES IN TRAFFIC: WOULD NEVER DOZE
HOW LIKELY ARE YOU TO NOD OFF OR FALL ASLEEP WHILE SITTING AND READING: WOULD NEVER DOZE
HOW LIKELY ARE YOU TO NOD OFF OR FALL ASLEEP WHEN YOU ARE A PASSENGER IN A CAR FOR AN HOUR WITHOUT A BREAK: WOULD NEVER DOZE
SLEEP_PROBLEM_NOTICEABLE_TO_OTHERS: NOT AT ALL NOTICEABLE

## 2024-10-14 ASSESSMENT — ENCOUNTER SYMPTOMS
NEUROLOGICAL NEGATIVE: 1
CONSTITUTIONAL NEGATIVE: 1
CARDIOVASCULAR NEGATIVE: 1
RESPIRATORY NEGATIVE: 1
PSYCHIATRIC NEGATIVE: 1

## 2024-10-14 NOTE — ASSESSMENT & PLAN NOTE
BMI Readings from Last 1 Encounters:   10/14/24 30.75 kg/m²     - encouraged healthy weight loss via diet and exercise

## 2024-10-14 NOTE — ASSESSMENT & PLAN NOTE
Suggested her to get out of bed and read during the middle of the night waking  Wait until she feels drowsy again, then go back to sleep

## 2024-10-14 NOTE — PROGRESS NOTES
Patient: Ely Mann    [id number]  : 1945 -- AGE 79 y.o.    Provider: Brennen Thayer MD     Location [facility]   Service Date: 10/14/2024              [facility]  Followup Visit Note    ASSESSMENT/PLAN     Ms. Mann is a 79 y.o. female and she returns in followup to the [facility] for the problems listed below.    Problem List, Orders, Assessment, Recommendations:  Problem List Items Addressed This Visit             ICD-10-CM    Benign essential hypertension I10     BP Readings from Last 1 Encounters:   10/14/24 147/78     - BP mildly elevated today but asymptomatic  - discussed at length the impact of untreated BARAK and BP control  - continue current management and follow-up with PCP           Obstructive sleep apnea - Primary G47.33     - doing well with PAP therapy  - continue current setting  - renew PAP supply orders  - she would like to try a non-pillow mask.  We fitted her with N30i (SW) and she liked it.           BMI 30.0-30.9,adult Z68.30     BMI Readings from Last 1 Encounters:   10/14/24 30.75 kg/m²     - encouraged healthy weight loss via diet and exercise           Inadequate sleep hygiene Z72.821     Suggested her to get out of bed and read during the middle of the night waking  Wait until she feels drowsy again, then go back to sleep            Disposition    Return to clinic in 12 months         HISTORY OF PRESENT ILLNESS     HISTORY OF PRESENT ILLNESS   Ely Mann is a 79 y.o. female with h/o Hypertension, BARAK, and Obesity who presents to a [facility] for followup.     Assessment and plan from last visit: 2023    79 yo with h/o obesity, HTN, BARAK on CPAP here for f/u visit     # BARAK  - previously diagnosed in  with severe BARAK with AHI ~ 35  - doing well with PAP therapy, good compliance  - finds her nasal pillow mask shifts sometimes and would wake her up, but she can go  back to sleep without issues  - continue 10-16 cwp  - dental appliance not an option as her implants and crowns may not be strong enough to hold OA  - renew supply order (also gave a sample F&P Brevida nasal pillow to try)     # HTN  - doing well 131/78  - no headache, blurry vision, chest pain, palpitation, dizziness, syncopal episodes   - continue to f/u with PCP      # Obesity  - working on weight loss  - current BMI ~ 32  - Encouraged continuing healthy weight loss via diet and exercise   - continue to f/u with PCP      RTC in 1 year     Current History    On today's visit, the patient reports that she has been doing well with PAP.  Some issues with nocturnal awakening and she feels like sometimes it takes her an hour to fall back to sleep.  She wants to try something other than a nasal pillow mask as she feels like it's making her nostril sore.  BP ok today.  Working on weight loss    PAP Info  DURABLE MEDICAL EQUIPMENT COMPANY: [employer]  Issues with therapy: ISSUES WITH THERAPY: None  Benefits with PAP: PERCEIVED BENEFITS OF PAP: refreshing sleep    PAP Adherence  A PAP adherence download was obtained and data was reviewed personally today in clinic.        RLS Followup:   none.    Daytime Symptoms    Patient reports DAYTIME SYMPTOMS: no daytime symptoms  Patient denies daytime symptoms including: Denies: excessive daytime sleepiness    Naps: No  Fatigue: denies feeling fatigue    ESS: 0  SUMMER: 0  FOSQ: 40    REVIEW OF SYSTEMS     REVIEW OF SYSTEMS  Review of Systems   Constitutional: Negative.    HENT: Negative.     Respiratory: Negative.     Cardiovascular: Negative.    Genitourinary: Negative.    Skin: Negative.    Neurological: Negative.    Psychiatric/Behavioral: Negative.           ALLERGIES AND MEDICATIONS     ALLERGIES  Allergies   Allergen Reactions    Ace Inhibitors Unknown    Arb-Angiotensin Receptor Antagonist Unknown    Aspirin Unknown    Caffeine Unknown    Trolamine Salicylate  Other       MEDICATIONS: She has a current medication list which includes the following prescription(s): amlodipine - TAKE 1 TABLET BY MOUTH ONCE  DAILY, vitamin b complex-vitamin c-folic acid - Take 1 capsule by mouth once daily, biotin - Take 1 tablet (10 mg) by mouth once daily, cholecalciferol - Take 1 capsule (50 mcg) by mouth once daily, empagliflozin - Take 1 tablet (10 mg) by mouth once daily, hydrochlorothiazide - TAKE 1 CAPSULE BY MOUTH ONCE  DAILY, and omeprazole - TAKE 1 CAPSULE BY MOUTH DAILY AS NEEDED FOR GASTROESOPHAGEAL  REFLUX DISEASE.    PAST MEDICAL HISTORY : She  has a past medical history of Body mass index (BMI) 31.0-31.9, adult (05/21/2021), Conjunctival hemorrhage, right eye (05/21/2021), Encounter for immunization (05/21/2021), Encounter for immunization (05/21/2021), Encounter for other screening for malignant neoplasm of breast (05/21/2021), Encounter for screening mammogram for malignant neoplasm of breast (05/21/2021), Other bursal cyst, unspecified site (10/27/2015), Other conditions influencing health status, Other injuries of right eye and orbit, sequela (05/21/2021), Other myositis, unspecified ankle and foot (05/21/2021), Other specified cough (05/21/2021), Pain in right leg (05/21/2021), Personal history of other diseases of the respiratory system (05/21/2021), Personal history of other diseases of the respiratory system, Personal history of other drug therapy (05/21/2021), Personal history of other medical treatment, Personal history of other specified conditions, and Personal history of other specified conditions (04/13/2020).    PAST SURGICAL HISTORY: She  has a past surgical history that includes Total abdominal hysterectomy (10/07/2021).     FAMILY HISTORY: No changes since previous visit. Otherwise non-contributory as charted.     SOCIAL HISTORY  She  reports that she has never smoked. She has never used smokeless tobacco. She reports that she does not currently use alcohol.  "She reports that she does not use drugs.       PHYSICAL EXAM     VITAL SIGNS: /78 (BP Location: Right arm, Patient Position: Sitting, BP Cuff Size: Adult)   Pulse 82   Ht 1.6 m (5' 3\")   Wt 78.7 kg (173 lb 9.6 oz)   BMI 30.75 kg/m²      PREVIOUS WEIGHTS:  Wt Readings from Last 3 Encounters:   10/14/24 78.7 kg (173 lb 9.6 oz)   09/26/24 78.5 kg (173 lb 1.6 oz)   09/03/24 79.1 kg (174 lb 6.1 oz)         RESULTS/DATA     Bicarbonate (mmol/L)   Date Value   09/26/2024 30   07/12/2024 30   03/18/2024 29     Iron (ug/dL)   Date Value   06/01/2021 56     Iron Saturation (%)   Date Value   06/01/2021 12 (L)     TIBC (ug/dL)   Date Value   06/01/2021 483 (H)     Ferritin (ug/L)   Date Value   06/01/2021 16               "

## 2024-10-14 NOTE — ASSESSMENT & PLAN NOTE
BP Readings from Last 1 Encounters:   10/14/24 147/78     - BP mildly elevated today but asymptomatic  - discussed at length the impact of untreated BARAK and BP control  - continue current management and follow-up with PCP

## 2024-10-14 NOTE — ASSESSMENT & PLAN NOTE
- doing well with PAP therapy  - continue current setting  - renew PAP supply orders  - she would like to try a non-pillow mask.  We fitted her with Rosangela (CHASTITY) and she liked it.

## 2024-11-01 PROCEDURE — RXMED WILLOW AMBULATORY MEDICATION CHARGE

## 2024-11-05 ENCOUNTER — PHARMACY VISIT (OUTPATIENT)
Dept: PHARMACY | Facility: CLINIC | Age: 79
End: 2024-11-05
Payer: COMMERCIAL

## 2024-11-10 DIAGNOSIS — I10 HYPERTENSION, UNSPECIFIED TYPE: ICD-10-CM

## 2024-11-16 RX ORDER — HYDROCHLOROTHIAZIDE 12.5 MG/1
12.5 CAPSULE ORAL DAILY
Qty: 90 CAPSULE | Refills: 3 | Status: SHIPPED | OUTPATIENT
Start: 2024-11-16

## 2024-12-10 ENCOUNTER — PATIENT MESSAGE (OUTPATIENT)
Dept: SLEEP MEDICINE | Facility: CLINIC | Age: 79
End: 2024-12-10
Payer: MEDICARE

## 2024-12-11 NOTE — PROGRESS NOTES
Clinical Pharmacy Appointment    Patient ID: Ely Mann is a 79 y.o. female who presents for Hypertension and Chronic Kidney Disease.    Pt is here for Follow Up appointment.     Referring Provider: Livia Jeronimo DO  PCP: Livia Jeronimo DO   Last visit with PCP: 9/26/2024   Next visit with PCP: 4/4/2025      Subjective   HPI  HYPERTENSION ASSESSMENT  Medication Therapy  Current Medications:   Amlodipine 10 mg; take 1 tablet daily  Hydrochlorothiazide 12.5 mg; take 1 capsule daily    Clarifications to above regimen: None   Adverse Effects: None     Home BP Monitoring  BP Cuff Type: None  Cuff Validated? No    Current home BP readings: Patient does not have a home BP monitor     BP Readings from Last 3 Encounters:   10/14/24 147/78   09/26/24 108/66   07/12/24 124/62       Lifestyle  Diet: Patient tries to eat healthy. She has oatmeal in the mornings and eats a lot of fruits and vegetables throughout the day, sometimes sausage or chicken.  Physical Activity: Patient goes to the gym 3-4 times a week and lifts weights.    Sodium Intake:  salt not added to cooking and salt shaker not on table    Risk Assessment  Major Risk Factors Include:  Hypertension  Dyslipidemia  Microalbuminuria or eGFR < 60  Age > 55 (men) or > 65 (women)  Sleep apnea  The ASCVD Risk score (Jaime DK, et al., 2019) failed to calculate for the following reasons:    The 2019 ASCVD risk score is only valid for ages 40 to 79  Known target organ damage:  Retinopathy    Secondary Prevention  On Statin?: No  Immunizations Needed: All up-to-date and documented  Tobacco Use: non-smoker     CHRONIC KIDNEY DISEASE ASSESSMENT  Does patient see nephrology? No    Current medications include:  Jardiance 10 mg; take 1 tablet daily    Clarifications to above regimen: None  Adverse Effects: None    Stage: 3b (eGFR 30-44)  Albuminuria: A1 (<30 mg/g)  ACE/ARB: No, patient has allergies  SGLT2i? Yes, Jardiance 10 mg daily    Medication Review  Current  medications and doses were reviewed and are appropriate per current kidney function.  The following medications increase risk of KAYLIN and should be closely monitored:  Diuretics    Hypertension History:  HTN diagnosis: yes  Current Regimen  Amlodipine 10 mg daily  Hydrochlorothiazide 12.5 mg daily  HTN at goal? no - most recent in office BP from 10/14/2024 was 147/78  BP Cuff at home? no    Hyperlipidemia History:  Diagnosis? yes  Current Regimen  None  At goal? no  Current LDL: 138 mg/dL  Current T mg/dL    Diabetes History:  Diagnosis? no    Lab Review  Electrolytes: Within normal limits (2024)  Alk phos: Within normal limits (2024)  Sodium bicarb: Within normal limits (2024)    Drug Interactions  No relevant drug interactions were noted.    Medication System Management  Patient's preferred pharmacy:  [facility]  Adherence/Organization: No concerns at this time  Affordability/Accessibility: Jardiance approved on  PAP through 2025    Objective   Allergies   Allergen Reactions    Ace Inhibitors Unknown    Arb-Angiotensin Receptor Antagonist Unknown    Aspirin Unknown    Caffeine Unknown    Trolamine Salicylate Other     Social History     Social History Narrative    Not on file      Medication Review  Current Outpatient Medications   Medication Instructions    amLODIPine (NORVASC) 10 mg, oral, Daily    B complex-vitamin C-folic acid (Nephrocaps) 1 mg capsule 1 capsule, oral, Daily    biotin 10 mg tablet 1 tablet, oral, Daily    cholecalciferol (Vitamin D-3) 50 mcg (2,000 unit) capsule 1 capsule, oral, Daily    hydroCHLOROthiazide (MICROZIDE) 12.5 mg, oral, Daily    Jardiance 10 mg, oral, Daily    omeprazole (PriLOSEC) 20 mg DR capsule TAKE 1 CAPSULE BY MOUTH DAILY AS NEEDED FOR GASTROESOPHAGEAL  REFLUX DISEASE      Vitals  BP Readings from Last 2 Encounters:   10/14/24 147/78   24 108/66     BMI Readings from Last 1 Encounters:   10/14/24 30.75 kg/m²     "  Labs  A1C  No results found for: \"HGBA1C\"  BMP  Lab Results   Component Value Date    CALCIUM 9.4 09/26/2024     09/26/2024    K 3.9 09/26/2024    CO2 30 09/26/2024     09/26/2024    BUN 18 09/26/2024    CREATININE 1.24 (H) 09/26/2024    EGFR 44 (L) 09/26/2024     LFTs  Lab Results   Component Value Date    ALT 15 07/12/2024    AST 19 07/12/2024    ALKPHOS 79 07/12/2024    BILITOT 0.6 07/12/2024     FLP  Lab Results   Component Value Date    TRIG 61 07/12/2024    CHOL 231 (H) 07/12/2024    LDLF 143 (H) 02/21/2023    LDLCALC 138 (H) 07/12/2024    HDL 80.9 07/12/2024     Urine Microalbumin  Lab Results   Component Value Date    MICROALBCREA 21.0 07/12/2024     Weight Management  Wt Readings from Last 3 Encounters:   10/14/24 78.7 kg (173 lb 9.6 oz)   09/26/24 78.5 kg (173 lb 1.6 oz)   09/03/24 79.1 kg (174 lb 6.1 oz)      There is no height or weight on file to calculate BMI.     Assessment/Plan   Problem List Items Addressed This Visit       Benign essential hypertension     ASSESSMENT OF SMBP MEASUREMENTS  Patient has not been monitoring BP at home  Most recent in office BP from 10/14/2024 was 147/78    Patient's goal BP < 130/80.   Rationale for plan: Patient's most recent in office BP was slightly elevated, but previous BP readings were at goal. Patient believes she might have been rushing around and that could have contributed to the elevated reading that day.    CrCl cannot be calculated (Patient's most recent lab result is older than the maximum 3 days allowed.).    Medication Changes:  CONTINUE  Amlodipine 10 mg; take 1 tablet daily  Hydrochlorothiazide 12.5 mg; take 1 capsule daily    Future Considerations:  Follow up with PCP to ensure BP remains controlled  Counseled patient to get a home BP cuff so she can monitor it more regularly at home    Monitoring and Education Discussed:  Eat right: Your diet should be rich in fruits, vegetables, potassium, and low-fat dairy products. You should also " reduce your intake of sodium and fats, particularly saturated fats.  Maintain a healthy weight: Try to achieve and maintain a healthy weight. If you are unsure what this means for you, please contact our clinic.  Exercise: Try to get at least 30 minutes of aerobic exercise every day.  Moderate your alcohol consumption: Limit your alcohol intake to one drink per day.  Monitor your blood pressure: You should check your blood pressure regularly. Notify our clinic if your blood pressure readings are consistently higher than recommended.         Chronic kidney disease, stage 3b (Multi)     ASSESSMENT OF CHRONIC KIDNEY DISEASE  Patient's CKD is stable  Rationale for plan: Patient is stable on Jardiance 10 mg daily, she will continue that regimen.    Medication Changes:  CONTINUE  Jardiance 10 mg; take 1 tablet daily    Monitoring and Education:  Reviewed CKD lab results and benefits of good hydration  Discussed avoidance of NSAIDs, and use of Tylenol for headaches/pain  Recommended need for good control of blood pressure and blood glucose  Advised to report any changes in fluid retention, weight or decreased urinary output            Clinical Pharmacist follow-up: 3/20/2025, Telehealth visit    Continue all meds under the continuation of care with the referring provider and clinical pharmacy team.    Thank you,  Barbie Crook, PharmD  Clinical Pharmacist - Primary Care  [phone]  12/12/2024    Verbal consent to manage patient's drug therapy was obtained from the patient. They were informed they may decline to participate or withdraw from participation in pharmacy services at any time.

## 2024-12-12 ENCOUNTER — APPOINTMENT (OUTPATIENT)
Dept: PHARMACY | Facility: HOSPITAL | Age: 79
End: 2024-12-12
Payer: MEDICARE

## 2024-12-12 DIAGNOSIS — I10 BENIGN ESSENTIAL HYPERTENSION: ICD-10-CM

## 2024-12-12 DIAGNOSIS — N18.32 CHRONIC KIDNEY DISEASE, STAGE 3B (MULTI): ICD-10-CM

## 2024-12-12 NOTE — ASSESSMENT & PLAN NOTE
ASSESSMENT OF CHRONIC KIDNEY DISEASE  Patient's CKD is stable  Rationale for plan: Patient is stable on Jardiance 10 mg daily, she will continue that regimen.    Medication Changes:  CONTINUE  Jardiance 10 mg; take 1 tablet daily    Monitoring and Education:  Reviewed CKD lab results and benefits of good hydration  Discussed avoidance of NSAIDs, and use of Tylenol for headaches/pain  Recommended need for good control of blood pressure and blood glucose  Advised to report any changes in fluid retention, weight or decreased urinary output

## 2024-12-12 NOTE — ASSESSMENT & PLAN NOTE
ASSESSMENT OF SMBP MEASUREMENTS  Patient has not been monitoring BP at home  Most recent in office BP from 10/14/2024 was 147/78    Patient's goal BP < 130/80.   Rationale for plan: Patient's most recent in office BP was slightly elevated, but previous BP readings were at goal. Patient believes she might have been rushing around and that could have contributed to the elevated reading that day.    CrCl cannot be calculated (Patient's most recent lab result is older than the maximum 3 days allowed.).    Medication Changes:  CONTINUE  Amlodipine 10 mg; take 1 tablet daily  Hydrochlorothiazide 12.5 mg; take 1 capsule daily    Future Considerations:  Follow up with PCP to ensure BP remains controlled  Counseled patient to get a home BP cuff so she can monitor it more regularly at home    Monitoring and Education Discussed:  Eat right: Your diet should be rich in fruits, vegetables, potassium, and low-fat dairy products. You should also reduce your intake of sodium and fats, particularly saturated fats.  Maintain a healthy weight: Try to achieve and maintain a healthy weight. If you are unsure what this means for you, please contact our clinic.  Exercise: Try to get at least 30 minutes of aerobic exercise every day.  Moderate your alcohol consumption: Limit your alcohol intake to one drink per day.  Monitor your blood pressure: You should check your blood pressure regularly. Notify our clinic if your blood pressure readings are consistently higher than recommended.

## 2024-12-26 ENCOUNTER — TELEPHONE (OUTPATIENT)
Dept: PRIMARY CARE | Facility: CLINIC | Age: 79
End: 2024-12-26
Payer: MEDICARE

## 2024-12-26 NOTE — TELEPHONE ENCOUNTER
Patient called into the office with her daughter speaking for her on the phone ( Karyna Baeza). She states the Jardiance her mom has been taking for the past 6m is giving her some side effects now. She states her mom takes this medication for prevention of kidney problems, not for DM. She states her mom has been dizzy, lightheaded and a sense of malaise for the past few weeks. She also has experienced loss of appetite and loss 10lbs. She is inquiring as to what should the next steps be regarding this medication and her symptoms.

## 2024-12-27 NOTE — TELEPHONE ENCOUNTER
Patient is scheduled for 1/8 and she was informed to hold the Jardiance. Message was relayed to her daughter Karyna as well who will try to make that appt with her.

## 2025-01-05 ENCOUNTER — HOSPITAL ENCOUNTER (EMERGENCY)
Facility: HOSPITAL | Age: 80
Discharge: HOME | End: 2025-01-05
Attending: EMERGENCY MEDICINE
Payer: MEDICARE

## 2025-01-05 ENCOUNTER — APPOINTMENT (OUTPATIENT)
Dept: RADIOLOGY | Facility: HOSPITAL | Age: 80
End: 2025-01-05
Payer: MEDICARE

## 2025-01-05 ENCOUNTER — APPOINTMENT (OUTPATIENT)
Dept: CARDIOLOGY | Facility: HOSPITAL | Age: 80
End: 2025-01-05
Payer: MEDICARE

## 2025-01-05 VITALS
SYSTOLIC BLOOD PRESSURE: 135 MMHG | OXYGEN SATURATION: 98 % | BODY MASS INDEX: 28.35 KG/M2 | RESPIRATION RATE: 18 BRPM | HEART RATE: 78 BPM | HEIGHT: 63 IN | TEMPERATURE: 99 F | WEIGHT: 160 LBS | DIASTOLIC BLOOD PRESSURE: 75 MMHG

## 2025-01-05 DIAGNOSIS — R55 NEAR SYNCOPE: Primary | ICD-10-CM

## 2025-01-05 LAB
ALBUMIN SERPL BCP-MCNC: 4 G/DL (ref 3.4–5)
ALP SERPL-CCNC: 75 U/L (ref 33–136)
ALT SERPL W P-5'-P-CCNC: 16 U/L (ref 7–45)
ANION GAP BLDV CALCULATED.4IONS-SCNC: 8 MMOL/L (ref 10–25)
ANION GAP SERPL CALC-SCNC: 12 MMOL/L (ref 10–20)
AST SERPL W P-5'-P-CCNC: 18 U/L (ref 9–39)
BASE EXCESS BLDV CALC-SCNC: 3.1 MMOL/L (ref -2–3)
BASOPHILS # BLD AUTO: 0.04 X10*3/UL (ref 0–0.1)
BASOPHILS NFR BLD AUTO: 0.6 %
BILIRUB SERPL-MCNC: 0.5 MG/DL (ref 0–1.2)
BODY TEMPERATURE: 37 DEGREES CELSIUS
BUN SERPL-MCNC: 13 MG/DL (ref 6–23)
CA-I BLDV-SCNC: 1.16 MMOL/L (ref 1.1–1.33)
CALCIUM SERPL-MCNC: 9.3 MG/DL (ref 8.6–10.3)
CARDIAC TROPONIN I PNL SERPL HS: 9 NG/L (ref 0–13)
CHLORIDE BLDV-SCNC: 97 MMOL/L (ref 98–107)
CHLORIDE SERPL-SCNC: 97 MMOL/L (ref 98–107)
CO2 SERPL-SCNC: 28 MMOL/L (ref 21–32)
CREAT SERPL-MCNC: 1.22 MG/DL (ref 0.5–1.05)
D DIMER PPP FEU-MCNC: 502 NG/ML FEU
EGFRCR SERPLBLD CKD-EPI 2021: 45 ML/MIN/1.73M*2
EOSINOPHIL # BLD AUTO: 0.03 X10*3/UL (ref 0–0.4)
EOSINOPHIL NFR BLD AUTO: 0.5 %
ERYTHROCYTE [DISTWIDTH] IN BLOOD BY AUTOMATED COUNT: 13.7 % (ref 11.5–14.5)
FLUAV RNA RESP QL NAA+PROBE: NOT DETECTED
FLUBV RNA RESP QL NAA+PROBE: NOT DETECTED
GLUCOSE BLDV-MCNC: 91 MG/DL (ref 74–99)
GLUCOSE SERPL-MCNC: 94 MG/DL (ref 74–99)
HCO3 BLDV-SCNC: 28.5 MMOL/L (ref 22–26)
HCT VFR BLD AUTO: 41.5 % (ref 36–46)
HCT VFR BLD EST: 46 % (ref 36–46)
HGB BLD-MCNC: 14.2 G/DL (ref 12–16)
HGB BLDV-MCNC: 15.3 G/DL (ref 12–16)
IMM GRANULOCYTES # BLD AUTO: 0.03 X10*3/UL (ref 0–0.5)
IMM GRANULOCYTES NFR BLD AUTO: 0.5 % (ref 0–0.9)
INHALED O2 CONCENTRATION: 95 %
INR PPP: 1 (ref 0.9–1.1)
LACTATE BLDV-SCNC: 0.7 MMOL/L (ref 0.4–2)
LYMPHOCYTES # BLD AUTO: 1.86 X10*3/UL (ref 0.8–3)
LYMPHOCYTES NFR BLD AUTO: 29.7 %
MAGNESIUM SERPL-MCNC: 2.21 MG/DL (ref 1.6–2.4)
MCH RBC QN AUTO: 31.3 PG (ref 26–34)
MCHC RBC AUTO-ENTMCNC: 34.2 G/DL (ref 32–36)
MCV RBC AUTO: 91 FL (ref 80–100)
MONOCYTES # BLD AUTO: 0.39 X10*3/UL (ref 0.05–0.8)
MONOCYTES NFR BLD AUTO: 6.2 %
NEUTROPHILS # BLD AUTO: 3.91 X10*3/UL (ref 1.6–5.5)
NEUTROPHILS NFR BLD AUTO: 62.5 %
NRBC BLD-RTO: 0 /100 WBCS (ref 0–0)
OXYHGB MFR BLDV: 41.1 % (ref 45–75)
PCO2 BLDV: 45 MM HG (ref 41–51)
PH BLDV: 7.41 PH (ref 7.33–7.43)
PLATELET # BLD AUTO: 294 X10*3/UL (ref 150–450)
PO2 BLDV: 29 MM HG (ref 35–45)
POTASSIUM BLDV-SCNC: 2.9 MMOL/L (ref 3.5–5.3)
POTASSIUM SERPL-SCNC: 3.6 MMOL/L (ref 3.5–5.3)
PROT SERPL-MCNC: 7.1 G/DL (ref 6.4–8.2)
PROTHROMBIN TIME: 11.3 SECONDS (ref 9.8–12.8)
RBC # BLD AUTO: 4.54 X10*6/UL (ref 4–5.2)
SAO2 % BLDV: 41 % (ref 45–75)
SARS-COV-2 RNA RESP QL NAA+PROBE: NOT DETECTED
SODIUM BLDV-SCNC: 131 MMOL/L (ref 136–145)
SODIUM SERPL-SCNC: 133 MMOL/L (ref 136–145)
WBC # BLD AUTO: 6.3 X10*3/UL (ref 4.4–11.3)

## 2025-01-05 PROCEDURE — 71046 X-RAY EXAM CHEST 2 VIEWS: CPT | Mod: FOREIGN READ | Performed by: RADIOLOGY

## 2025-01-05 PROCEDURE — 84132 ASSAY OF SERUM POTASSIUM: CPT | Performed by: NURSE PRACTITIONER

## 2025-01-05 PROCEDURE — 36415 COLL VENOUS BLD VENIPUNCTURE: CPT | Performed by: NURSE PRACTITIONER

## 2025-01-05 PROCEDURE — 71046 X-RAY EXAM CHEST 2 VIEWS: CPT

## 2025-01-05 PROCEDURE — 84484 ASSAY OF TROPONIN QUANT: CPT | Performed by: NURSE PRACTITIONER

## 2025-01-05 PROCEDURE — 2500000001 HC RX 250 WO HCPCS SELF ADMINISTERED DRUGS (ALT 637 FOR MEDICARE OP): Performed by: EMERGENCY MEDICINE

## 2025-01-05 PROCEDURE — 99285 EMERGENCY DEPT VISIT HI MDM: CPT | Mod: 25 | Performed by: EMERGENCY MEDICINE

## 2025-01-05 PROCEDURE — 85379 FIBRIN DEGRADATION QUANT: CPT | Performed by: NURSE PRACTITIONER

## 2025-01-05 PROCEDURE — 70450 CT HEAD/BRAIN W/O DYE: CPT | Performed by: RADIOLOGY

## 2025-01-05 PROCEDURE — 93005 ELECTROCARDIOGRAM TRACING: CPT

## 2025-01-05 PROCEDURE — 2500000002 HC RX 250 W HCPCS SELF ADMINISTERED DRUGS (ALT 637 FOR MEDICARE OP, ALT 636 FOR OP/ED): Performed by: EMERGENCY MEDICINE

## 2025-01-05 PROCEDURE — 83735 ASSAY OF MAGNESIUM: CPT | Performed by: NURSE PRACTITIONER

## 2025-01-05 PROCEDURE — 85025 COMPLETE CBC W/AUTO DIFF WBC: CPT | Performed by: NURSE PRACTITIONER

## 2025-01-05 PROCEDURE — 70450 CT HEAD/BRAIN W/O DYE: CPT

## 2025-01-05 PROCEDURE — 85610 PROTHROMBIN TIME: CPT | Performed by: NURSE PRACTITIONER

## 2025-01-05 PROCEDURE — 87636 SARSCOV2 & INF A&B AMP PRB: CPT | Performed by: NURSE PRACTITIONER

## 2025-01-05 RX ORDER — POTASSIUM CHLORIDE 1.5 G/1.58G
20 POWDER, FOR SOLUTION ORAL ONCE
Status: COMPLETED | OUTPATIENT
Start: 2025-01-05 | End: 2025-01-05

## 2025-01-05 RX ORDER — POTASSIUM CHLORIDE 750 MG/1
10 TABLET, FILM COATED, EXTENDED RELEASE ORAL DAILY
Qty: 10 TABLET | Refills: 0 | Status: SHIPPED | OUTPATIENT
Start: 2025-01-05 | End: 2025-01-15

## 2025-01-05 RX ORDER — POTASSIUM CHLORIDE 20 MEQ/1
10 TABLET, EXTENDED RELEASE ORAL ONCE
Status: COMPLETED | OUTPATIENT
Start: 2025-01-05 | End: 2025-01-05

## 2025-01-05 RX ADMIN — POTASSIUM CHLORIDE 10 MEQ: 1500 TABLET, EXTENDED RELEASE ORAL at 18:34

## 2025-01-05 RX ADMIN — POTASSIUM CHLORIDE 20 MEQ: 1.5 POWDER, FOR SOLUTION ORAL at 18:34

## 2025-01-05 ASSESSMENT — COLUMBIA-SUICIDE SEVERITY RATING SCALE - C-SSRS
2. HAVE YOU ACTUALLY HAD ANY THOUGHTS OF KILLING YOURSELF?: NO
6. HAVE YOU EVER DONE ANYTHING, STARTED TO DO ANYTHING, OR PREPARED TO DO ANYTHING TO END YOUR LIFE?: NO
1. IN THE PAST MONTH, HAVE YOU WISHED YOU WERE DEAD OR WISHED YOU COULD GO TO SLEEP AND NOT WAKE UP?: NO

## 2025-01-05 ASSESSMENT — PAIN - FUNCTIONAL ASSESSMENT: PAIN_FUNCTIONAL_ASSESSMENT: 0-10

## 2025-01-05 ASSESSMENT — PAIN SCALES - GENERAL: PAINLEVEL_OUTOF10: 0 - NO PAIN

## 2025-01-05 NOTE — ED TRIAGE NOTES
"Pt arrives to ED with complaints of a \"visual black out\". Pt states she never lost consciousness but states she felt like she couldn't see for a minute or two. Pt reports she had an episode similar to this in November. Pt is having a heaviness in her head. Pt denies pain and describes feeling as \"heavy\". Pt was recently taken off of Jaurdiance and was on it for about 6 months prior to getting off of it about a week and 2 days ago. Pt states she has been having occasional palpitations also. Pt states symptoms have been happening over the last few weeks.     "

## 2025-01-05 NOTE — ED PROVIDER NOTES
HPI   Chief Complaint   Patient presents with    Syncope       Chief complaint: Headache, near syncope    History of present illness: Patient is an 80-year-old female accompanied by her daughter presenting to the emergency department with several concerns.  According to the patient and family, the patient has been having episodes where she becomes lightheaded and is not completely pass out.  This is occurred several times over the past few months.  Patient has also been experiencing visual changes which she is already seen an ophthalmologist for.  The patient states that she has been experiencing pressure in her head.  Patient was on the phone with her daughter today while cooking and stated that she was about to faint.  The patient has lost some weight recently.  Concern, the patient presents to the emergency department for further evaluation.      History provided by:  Patient   used: No            Patient History   Past Medical History:   Diagnosis Date    Body mass index (BMI) 31.0-31.9, adult 05/21/2021    BMI 31.0-31.9,adult    Conjunctival hemorrhage, right eye 05/21/2021    Subconjunctival hemorrhage of right eye    Encounter for immunization 05/21/2021    Need for Tdap vaccination    Encounter for immunization 05/21/2021    Need for shingles vaccine    Encounter for other screening for malignant neoplasm of breast 05/21/2021    Screening for breast cancer    Encounter for screening mammogram for malignant neoplasm of breast 05/21/2021    Other screening mammogram    Other bursal cyst, unspecified site 10/27/2015    Synovial cyst    Other conditions influencing health status     Normal colonoscopy    Other injuries of right eye and orbit, sequela 05/21/2021    Superficial injury of right eye, sequela    Other myositis, unspecified ankle and foot 05/21/2021    Other myositis of ankle, unspecified laterality    Other specified cough 05/21/2021    ACE-inhibitor cough    Pain in right leg  05/21/2021    Pain in both lower extremities    Personal history of other diseases of the respiratory system 05/21/2021    History of chronic sinusitis    Personal history of other diseases of the respiratory system     Personal history of sinusitis    Personal history of other drug therapy 05/21/2021    History of influenza vaccination    Personal history of other medical treatment     H/O mammogram    Personal history of other specified conditions     History of heartburn    Personal history of other specified conditions 04/13/2020    History of insomnia     Past Surgical History:   Procedure Laterality Date    TOTAL ABDOMINAL HYSTERECTOMY  10/07/2021    Total Abdominal Hysterectomy     Family History   Problem Relation Name Age of Onset    Ovarian cancer Mother  58    Breast cancer Daughter  40     Social History     Tobacco Use    Smoking status: Never    Smokeless tobacco: Never   Substance Use Topics    Alcohol use: Not Currently    Drug use: Never       Physical Exam   ED Triage Vitals [01/05/25 1602]   Temperature Heart Rate Respirations BP   37.2 °C (99 °F) 81 17 154/79      Pulse Ox Temp Source Heart Rate Source Patient Position   98 % Temporal Monitor --      BP Location FiO2 (%)     -- --       Physical Exam  Constitutional:       Appearance: Normal appearance.   HENT:      Head: Normocephalic and atraumatic.      Right Ear: External ear normal.      Left Ear: External ear normal.      Nose: Nose normal.      Mouth/Throat:      Mouth: Mucous membranes are moist.   Eyes:      General: Lids are normal.      Extraocular Movements: Extraocular movements intact.      Pupils: Pupils are equal, round, and reactive to light.   Cardiovascular:      Rate and Rhythm: Normal rate and regular rhythm.      Heart sounds: Normal heart sounds.   Pulmonary:      Effort: Pulmonary effort is normal.      Breath sounds: Normal breath sounds.   Abdominal:      General: Abdomen is flat.      Palpations: Abdomen is soft.       Tenderness: There is no abdominal tenderness. There is no guarding or rebound.   Musculoskeletal:         General: No deformity. Normal range of motion.      Cervical back: Normal range of motion and neck supple.   Skin:     General: Skin is warm.      Capillary Refill: Capillary refill takes less than 2 seconds.      Coloration: Skin is not jaundiced.   Neurological:      General: No focal deficit present.      Mental Status: She is alert and oriented to person, place, and time.   Psychiatric:         Mood and Affect: Mood normal.         Behavior: Behavior normal.           ED Course & MDM   Diagnoses as of 01/07/25 2210   Near syncope                 No data recorded     [place] Coma Scale Score: 15 (01/05/25 1556 : Gurwinder Agrawal, EKE)                           Medical Decision Making  Medical Decision Making: Patient remained stable during her time in the emergency department.  CBC demonstrated no significant abnormalities.  Patient's Chem-7 demonstrated creatinine of 1.22 patient potassium was 3.6 however on VBG this was only 2.9.  The patient's INR is 1.0 magnesium is 2.21 troponin is negative D-dimer is 502 however this is negative when age-adjusted influenza and COVID screens are negative.  Chest x-ray demonstrated no significant abnormalities while CAT scan of the patient's head without IV contrast demonstrated no significant acute abnormalities.  Finally, the patient's EKG demonstrated a normal sinus rhythm with a rate of 75 bpm isoelectric ST segments narrow QRS complexes and a QTc of 466.    Patient presents to the emergency department with complaints of a headache and near syncopal episodes.  Workup was performed as above.  Initially, the patient's potassium was only 2.9 based on VBG and as a result I ordered the patient's supplemental potassium.  According to the patient family, the patient is on hydrochlorothiazide at baseline without his potassium supplementation although the patient's repeat potassium  was 3.6, I still gave the patient a prescription for supplemental potassium for home use.  Patient and family were reassured that her workup is negative at this time.  Patient was instructed to follow-up with a primary care physician and to return for any worsening symptoms.  Patient and family were in agreement with this.  The patient was then discharged home in stable condition in the custody of her family.    Amount and/or Complexity of Data Reviewed  Labs: ordered. Decision-making details documented in ED Course.  Radiology: ordered. Decision-making details documented in ED Course.  ECG/medicine tests: ordered and independent interpretation performed. Decision-making details documented in ED Course.        Procedure  Procedures     Bharat Encinas MD  01/07/25 7110

## 2025-01-05 NOTE — ED TRIAGE NOTES
" TRIAGE NOTE   I saw the patient as the Clinician in Triage and performed a brief history and physical exam, established acuity, and ordered appropriate tests to develop basic plan of care. Patient will be seen by an PILY, resident and/or physician who will independently evaluate the patient. Please see subsequent provider notes for further details and disposition.     Brief HPI: In brief, Ely Mann is a 80 y.o. female that presents for syncopal episode while she was on the phone with her daughter.  She has been experiencing multiple syncopal episodes over the last few months.  She was also experiencing vision change.  She went to an ophthalmologist and she was diagnosed with dry eyes and placed on steroid drops.  The blackouts are concerning for her daughter who is bedside and she is a nurse  at [employer].  She describes pressure in her head.  The symptoms reoccurred today between 130 and 2 PM while she was on the phone with her daughter.  She indicated out loud \"oh I feel like I am ready to faint\".  Patient has lost 15 pounds in the last 6 months.  She stopped taking her Jardiance on December 27 of last year.  She had started Jardiance on April 26 of last year.  She has a history of chronic kidney disease.  She has a history of hypertension and is on hydrochlorothiazide and amlodipine.  She indicates that she has been having irregular bowel movements and a poor appetite.  Daughter also notes that patient has been having some memory lapses but she recovers quickly..     Focused Physical exam:   NIH is 0.  Stroke fast negative.  Clear bilateral lung sounds.  Normal S1-S2 and rate with a strong loud heart rate without any murmur or irregularity.  Skin is normal in temperature and color.  Equal bilateral upper and lower extremity strength.  Abdomen is soft nontender.  Chest is soft nontender.  Conjunctiva is normal in color.  No vision cut or vision change.  Plan/MDM:   CT head was " ordered for near syncopal episode.  Chest x-ray ordered for near syncopal episode.  EKG for near syncopal.  I asked for a point-of-care blood glucose.  CBC CMP coags venous blood gas and a VTE D-dimer with concern for possible PE that could have caused the syncopal episode  Please see subsequent provider note for further details and disposition

## 2025-01-06 LAB
ATRIAL RATE: 75 BPM
P AXIS: 22 DEGREES
P OFFSET: 177 MS
P ONSET: 131 MS
PR INTERVAL: 158 MS
Q ONSET: 210 MS
QRS COUNT: 12 BEATS
QRS DURATION: 150 MS
QT INTERVAL: 418 MS
QTC CALCULATION(BAZETT): 466 MS
QTC FREDERICIA: 450 MS
R AXIS: -81 DEGREES
T AXIS: 23 DEGREES
T OFFSET: 419 MS
VENTRICULAR RATE: 75 BPM

## 2025-01-08 ENCOUNTER — APPOINTMENT (OUTPATIENT)
Dept: PRIMARY CARE | Facility: CLINIC | Age: 80
End: 2025-01-08
Payer: MEDICARE

## 2025-01-08 ENCOUNTER — LAB (OUTPATIENT)
Dept: LAB | Facility: LAB | Age: 80
End: 2025-01-08
Payer: MEDICARE

## 2025-01-08 VITALS
BODY MASS INDEX: 29.06 KG/M2 | HEIGHT: 63 IN | SYSTOLIC BLOOD PRESSURE: 127 MMHG | HEART RATE: 83 BPM | DIASTOLIC BLOOD PRESSURE: 76 MMHG | TEMPERATURE: 97 F | OXYGEN SATURATION: 97 % | RESPIRATION RATE: 14 BRPM | WEIGHT: 164 LBS

## 2025-01-08 DIAGNOSIS — I10 BENIGN ESSENTIAL HYPERTENSION: ICD-10-CM

## 2025-01-08 DIAGNOSIS — H54.7 VISION LOSS: ICD-10-CM

## 2025-01-08 DIAGNOSIS — K21.9 GASTROESOPHAGEAL REFLUX DISEASE, UNSPECIFIED WHETHER ESOPHAGITIS PRESENT: ICD-10-CM

## 2025-01-08 DIAGNOSIS — H04.129 DRY EYE: ICD-10-CM

## 2025-01-08 DIAGNOSIS — N18.32 CHRONIC KIDNEY DISEASE, STAGE 3B (MULTI): ICD-10-CM

## 2025-01-08 DIAGNOSIS — R51.9 ACUTE NONINTRACTABLE HEADACHE, UNSPECIFIED HEADACHE TYPE: ICD-10-CM

## 2025-01-08 DIAGNOSIS — R68.2 DRY MOUTH: ICD-10-CM

## 2025-01-08 DIAGNOSIS — R55 NEAR SYNCOPE: Primary | ICD-10-CM

## 2025-01-08 DIAGNOSIS — R55 NEAR SYNCOPE: ICD-10-CM

## 2025-01-08 LAB
ALBUMIN SERPL BCP-MCNC: 4.6 G/DL (ref 3.4–5)
ALP SERPL-CCNC: 74 U/L (ref 33–136)
ALT SERPL W P-5'-P-CCNC: 15 U/L (ref 7–45)
ANION GAP SERPL CALC-SCNC: 14 MMOL/L (ref 10–20)
AST SERPL W P-5'-P-CCNC: 20 U/L (ref 9–39)
BILIRUB SERPL-MCNC: 0.5 MG/DL (ref 0–1.2)
BUN SERPL-MCNC: 16 MG/DL (ref 6–23)
CALCIUM SERPL-MCNC: 9.5 MG/DL (ref 8.6–10.6)
CHLORIDE SERPL-SCNC: 98 MMOL/L (ref 98–107)
CO2 SERPL-SCNC: 27 MMOL/L (ref 21–32)
CREAT SERPL-MCNC: 1.08 MG/DL (ref 0.5–1.05)
CRP SERPL-MCNC: <0.1 MG/DL
EGFRCR SERPLBLD CKD-EPI 2021: 52 ML/MIN/1.73M*2
ERYTHROCYTE [SEDIMENTATION RATE] IN BLOOD BY WESTERGREN METHOD: 20 MM/H (ref 0–30)
GLUCOSE SERPL-MCNC: 96 MG/DL (ref 74–99)
MAGNESIUM SERPL-MCNC: 2.32 MG/DL (ref 1.6–2.4)
POTASSIUM SERPL-SCNC: 3.8 MMOL/L (ref 3.5–5.3)
PROT SERPL-MCNC: 7.5 G/DL (ref 6.4–8.2)
RHEUMATOID FACT SER NEPH-ACNC: <10 IU/ML (ref 0–15)
SODIUM SERPL-SCNC: 135 MMOL/L (ref 136–145)
TSH SERPL-ACNC: 1.07 MIU/L (ref 0.44–3.98)
VIT B12 SERPL-MCNC: 630 PG/ML (ref 211–911)

## 2025-01-08 PROCEDURE — 99215 OFFICE O/P EST HI 40 MIN: CPT | Performed by: FAMILY MEDICINE

## 2025-01-08 PROCEDURE — 85652 RBC SED RATE AUTOMATED: CPT

## 2025-01-08 PROCEDURE — 3078F DIAST BP <80 MM HG: CPT | Performed by: FAMILY MEDICINE

## 2025-01-08 PROCEDURE — 82607 VITAMIN B-12: CPT

## 2025-01-08 PROCEDURE — G2211 COMPLEX E/M VISIT ADD ON: HCPCS | Performed by: FAMILY MEDICINE

## 2025-01-08 PROCEDURE — 86225 DNA ANTIBODY NATIVE: CPT

## 2025-01-08 PROCEDURE — 84443 ASSAY THYROID STIM HORMONE: CPT

## 2025-01-08 PROCEDURE — 86140 C-REACTIVE PROTEIN: CPT

## 2025-01-08 PROCEDURE — 1123F ACP DISCUSS/DSCN MKR DOCD: CPT | Performed by: FAMILY MEDICINE

## 2025-01-08 PROCEDURE — 1159F MED LIST DOCD IN RCRD: CPT | Performed by: FAMILY MEDICINE

## 2025-01-08 PROCEDURE — 1160F RVW MEDS BY RX/DR IN RCRD: CPT | Performed by: FAMILY MEDICINE

## 2025-01-08 PROCEDURE — 83735 ASSAY OF MAGNESIUM: CPT

## 2025-01-08 PROCEDURE — 3074F SYST BP LT 130 MM HG: CPT | Performed by: FAMILY MEDICINE

## 2025-01-08 PROCEDURE — 86038 ANTINUCLEAR ANTIBODIES: CPT

## 2025-01-08 PROCEDURE — 80053 COMPREHEN METABOLIC PANEL: CPT

## 2025-01-08 PROCEDURE — 86431 RHEUMATOID FACTOR QUANT: CPT

## 2025-01-08 NOTE — PROGRESS NOTES
"Subjective   Patient ID: Ely Mann is a 80 y.o. female who presents for Follow-up (Side effects from Jardiance).    HPI     Went to emergency department with complaints of a headache and near syncopal episodes.   Message was sent from her daughter 1 wk ago about these symptoms and we advised to stop taking the Jardiance (which had been on for 6months) in fear these for SE from using    States on On 11/10/24- was with \"black out\" and lost vision, ( no LOC) and no dizziness  Seen by ophtho- was given steroids eye drop X 1 wk, was thinking due to dry eyes.   Then developed symptoms of dizzy, nausea, dec appetitis (lost 10-15lb), palpitations, change in vision, \"heaviness in the head\", lightheaded and a sense of malaise for the past few weeks.   States headache worsened with movement (this AM had HA, but resolved after sitting still)     Of note- in the room with her daugther (Karyna Baeza)  Will sign HIPAA to be able to discuss with MsDimitri Aryan medical info    Review of Systems  All systems reviewed and neg if not noted in the HPI above    Objective   /76 (Patient Position: Sitting)   Pulse 83   Temp 36.1 °C (97 °F)   Resp 14   Ht 1.6 m (5' 3\")   Wt 74.4 kg (164 lb)   SpO2 97%   BMI 29.05 kg/m²     Physical Exam  Vitals reviewed.   Constitutional:       Appearance: Normal appearance.   HENT:      Head: Normocephalic.   Eyes:      Extraocular Movements: Extraocular movements intact.   Cardiovascular:      Rate and Rhythm: Normal rate and regular rhythm.      Heart sounds: Normal heart sounds. No murmur heard.  Pulmonary:      Effort: Pulmonary effort is normal. No respiratory distress.      Breath sounds: Normal breath sounds. No wheezing.   Abdominal:      General: Bowel sounds are normal. There is no distension.      Palpations: Abdomen is soft.   Skin:     General: Skin is warm and dry.   Neurological:      General: No focal deficit present.      Mental Status: She is alert and oriented to person, place, " and time.   Psychiatric:         Mood and Affect: Mood normal.         Behavior: Behavior normal.         Thought Content: Thought content normal.         Judgment: Judgment normal.         Assessment/Plan   Problem List Items Addressed This Visit             ICD-10-CM    Benign essential hypertension I10    Relevant Orders    TSH with reflex to Free T4 if abnormal (Completed)    Chronic kidney disease, stage 3b (Multi) N18.32     GFR45<---44  Repeating today  Over all stable  Continue to monitor           Relevant Orders    Comprehensive Metabolic Panel (Completed)    TSH with reflex to Free T4 if abnormal (Completed)    Gastroesophageal reflux disease K21.9    Relevant Orders    Magnesium (Completed)    Vitamin B12 (Completed)    TSH with reflex to Free T4 if abnormal (Completed)     Other Visit Diagnoses         Codes    Near syncope    -  Primary R55    Relevant Orders    Comprehensive Metabolic Panel (Completed)    MR brain w and wo IV contrast    Referral to Ophthalmology    MACARENA without Reflex KEITH    Anti-DNA antibody, double-stranded (Completed)    Rheumatoid factor (Completed)    Sedimentation rate, automated (Completed)    C-reactive protein (Completed)    Magnesium (Completed)    Vitamin B12 (Completed)    TSH with reflex to Free T4 if abnormal (Completed)    Referral to Neurology    Vision loss     H54.7    Relevant Orders    Comprehensive Metabolic Panel (Completed)    MR brain w and wo IV contrast    Referral to Ophthalmology    Referral to Neurology    Acute nonintractable headache, unspecified headache type     R51.9    Relevant Orders    Comprehensive Metabolic Panel (Completed)    MR brain w and wo IV contrast    Referral to Ophthalmology    Referral to Neurology    Dry eye     H04.129    Relevant Orders    MACARENA without Reflex KEITH    Anti-DNA antibody, double-stranded (Completed)    Rheumatoid factor (Completed)    Sedimentation rate, automated (Completed)    C-reactive protein (Completed)    Dry  mouth     R68.2    Relevant Orders    MACARENA without Reflex KEITH    Anti-DNA antibody, double-stranded (Completed)    Rheumatoid factor (Completed)    Sedimentation rate, automated (Completed)    C-reactive protein (Completed)            Please follow up in APRIL as scheduled  or as needed.

## 2025-01-08 NOTE — PATIENT INSTRUCTIONS
Happy Birthday  Wishing you a day filled with happiness and a year filled with wade     ===============    Eyes and mouth dry  - labs    Change in vision with headaches  - Labs today  - Referral for Optho- neurology and headache neurology  - Ordered MRI brain      Please follow up in APRIL as scheduled  or as needed.       ** If labs or imaging ordered at today's visit, all the non-urgent results will be discussed at your next visit    If you have been referred for a special test or to a specialist please call  [phone] to schedule an appointment.  If you have any further questions, or if develop new or worsened symptoms, please give our office a call at [phone].

## 2025-01-09 LAB
ANA PATTERN: ABNORMAL
ANA SER QL HEP2 SUBST: POSITIVE
ANA TITR SER IF: ABNORMAL {TITER}
DSDNA AB SER-ACNC: <1 IU/ML

## 2025-01-31 ENCOUNTER — HOSPITAL ENCOUNTER (OUTPATIENT)
Dept: RADIOLOGY | Facility: CLINIC | Age: 80
End: 2025-01-31
Payer: MEDICARE

## 2025-02-05 ENCOUNTER — HOSPITAL ENCOUNTER (OUTPATIENT)
Dept: RADIOLOGY | Facility: CLINIC | Age: 80
Discharge: HOME | End: 2025-02-05
Payer: MEDICARE

## 2025-02-05 DIAGNOSIS — H54.7 VISION LOSS: ICD-10-CM

## 2025-02-05 DIAGNOSIS — R55 NEAR SYNCOPE: ICD-10-CM

## 2025-02-05 DIAGNOSIS — R51.9 ACUTE NONINTRACTABLE HEADACHE, UNSPECIFIED HEADACHE TYPE: ICD-10-CM

## 2025-02-05 PROCEDURE — 70553 MRI BRAIN STEM W/O & W/DYE: CPT

## 2025-02-05 PROCEDURE — A9575 INJ GADOTERATE MEGLUMI 0.1ML: HCPCS | Performed by: FAMILY MEDICINE

## 2025-02-05 PROCEDURE — 2550000001 HC RX 255 CONTRASTS: Performed by: FAMILY MEDICINE

## 2025-02-05 RX ORDER — GADOTERATE MEGLUMINE 376.9 MG/ML
15 INJECTION INTRAVENOUS
Status: COMPLETED | OUTPATIENT
Start: 2025-02-05 | End: 2025-02-05

## 2025-02-05 RX ADMIN — GADOTERATE MEGLUMINE 15 ML: 376.9 INJECTION INTRAVENOUS at 16:26

## 2025-02-07 ENCOUNTER — OFFICE VISIT (OUTPATIENT)
Dept: PRIMARY CARE | Facility: CLINIC | Age: 80
End: 2025-02-07
Payer: MEDICARE

## 2025-02-07 VITALS
BODY MASS INDEX: 28.7 KG/M2 | RESPIRATION RATE: 14 BRPM | OXYGEN SATURATION: 99 % | WEIGHT: 162 LBS | HEIGHT: 63 IN | HEART RATE: 54 BPM | SYSTOLIC BLOOD PRESSURE: 122 MMHG | DIASTOLIC BLOOD PRESSURE: 72 MMHG | TEMPERATURE: 97 F

## 2025-02-07 DIAGNOSIS — H70.91 MASTOIDITIS, RIGHT: ICD-10-CM

## 2025-02-07 DIAGNOSIS — E78.5 HYPERLIPIDEMIA, UNSPECIFIED HYPERLIPIDEMIA TYPE: ICD-10-CM

## 2025-02-07 DIAGNOSIS — Z86.73 HX OF TIA (TRANSIENT ISCHEMIC ATTACK) AND STROKE: ICD-10-CM

## 2025-02-07 DIAGNOSIS — H92.01 RIGHT EAR PAIN: ICD-10-CM

## 2025-02-07 DIAGNOSIS — R42 DIZZINESS: Primary | ICD-10-CM

## 2025-02-07 DIAGNOSIS — R42 VERTIGO: ICD-10-CM

## 2025-02-07 PROCEDURE — 3078F DIAST BP <80 MM HG: CPT | Performed by: FAMILY MEDICINE

## 2025-02-07 PROCEDURE — 99214 OFFICE O/P EST MOD 30 MIN: CPT | Performed by: FAMILY MEDICINE

## 2025-02-07 PROCEDURE — 3074F SYST BP LT 130 MM HG: CPT | Performed by: FAMILY MEDICINE

## 2025-02-07 PROCEDURE — 1159F MED LIST DOCD IN RCRD: CPT | Performed by: FAMILY MEDICINE

## 2025-02-07 PROCEDURE — 1160F RVW MEDS BY RX/DR IN RCRD: CPT | Performed by: FAMILY MEDICINE

## 2025-02-07 PROCEDURE — G2211 COMPLEX E/M VISIT ADD ON: HCPCS | Performed by: FAMILY MEDICINE

## 2025-02-07 PROCEDURE — 1123F ACP DISCUSS/DSCN MKR DOCD: CPT | Performed by: FAMILY MEDICINE

## 2025-02-07 RX ORDER — ATORVASTATIN CALCIUM 40 MG/1
40 TABLET, FILM COATED ORAL NIGHTLY
Qty: 90 TABLET | Refills: 0 | Status: SHIPPED | OUTPATIENT
Start: 2025-02-07 | End: 2025-05-08

## 2025-02-07 RX ORDER — AMOXICILLIN AND CLAVULANATE POTASSIUM 875; 125 MG/1; MG/1
875 TABLET, FILM COATED ORAL 2 TIMES DAILY
Qty: 14 TABLET | Refills: 0 | Status: SHIPPED | OUTPATIENT
Start: 2025-02-07 | End: 2025-02-14

## 2025-02-07 RX ORDER — MECLIZINE HCL 12.5 MG 12.5 MG/1
12.5-25 TABLET ORAL 3 TIMES DAILY PRN
Qty: 30 TABLET | Refills: 0 | Status: SHIPPED | OUTPATIENT
Start: 2025-02-07

## 2025-02-07 ASSESSMENT — PATIENT HEALTH QUESTIONNAIRE - PHQ9
2. FEELING DOWN, DEPRESSED OR HOPELESS: NOT AT ALL
SUM OF ALL RESPONSES TO PHQ9 QUESTIONS 1 AND 2: 0
1. LITTLE INTEREST OR PLEASURE IN DOING THINGS: NOT AT ALL

## 2025-02-07 NOTE — PATIENT INSTRUCTIONS
Ear pain with fluid behind the ear  - referral for ENT  - try Augmentin X 7 days'  - Meclizine for dizziness as needed  - upcoming appt with neurology  - ok to cancel the optho appt    Cholesterol and possible stroke  - Ok to start  atorvastatin 40mg  - labs in 3 months        Please follow up in April as scheduled or as needed.       ** If labs or imaging ordered at today's visit, all the non-urgent results will be discussed at your next visit    If you have been referred for a special test or to a specialist please call  [phone] to schedule an appointment.  If you have any further questions, or if develop new or worsened symptoms, please give our office a call at [phone].

## 2025-02-07 NOTE — PROGRESS NOTES
"Subjective   Patient ID: Ely Mann is a 80 y.o. female who presents for Follow-up (Discuss MRI).    HPI     Review of Systems    Objective   /72 (Patient Position: Sitting)   Pulse 54   Temp 36.1 °C (97 °F)   Resp 14   Ht 1.6 m (5' 3\")   Wt 73.5 kg (162 lb)   SpO2 99%   BMI 28.70 kg/m²     Physical Exam    Assessment/Plan   {Assess/PlanSmartLinks:23393}         Please follow up in April as scheduled or as needed.       " loss,R51.9  Headache, unspecified      COMPARISON:  CT head January 5, 2025.      ACCESSION NUMBER(S):  [id number]      ORDERING CLINICIAN:  EMMANUEL CHAN      TECHNIQUE:  Axial T2, FLAIR, DWI, gradient echo T2 and sagittal and coronal T1  weighted images of brain were acquired.  15 cc Dotarem administered  intravenously. Volumetric 3D post-contrast imaging was obtained.      FINDINGS:  No abnormally restricted diffusion.     No acute intracranial  hemorrhage.     No extra-axial fluid collection. No intracranial  mass. Major vascular flow voids intact.      Mild white matter FLAIR signal increase most commonly seen with early  chronic small vessel ischemic change. See below.      No abnormal brain parenchymal or meningeal enhancement.      Tiny focus gliosis right precentral gyrus may represent an old  infarction. Tiny foci of T2 signal increase right thalamus and left  cerebellar hemispheric could represent old lacunar infarctions.      Degree of ventriculomegaly is commensurate with overall degree of  brain parenchymal volume loss, which is mild.      No significant paranasal sinus/ethmoid mucosal inflammatory changes.  Left-sided nonspecific mastoid fluid. Orbital contents unremarkable.  No destructive osseous lesions identified.      IMPRESSION:  1. No evidence of acute infarct, intracranial mass effect or midline  shift.  2. Mild white matter FLAIR signal increase most commonly seen with  early chronic small vessel ischemic change. Primary demyelinating  disease process considered less likely. Old lacunar infarctions in  these areas are also not excluded.  3. Degree of ventriculomegaly is commensurate with overall degree of  brain parenchymal volume loss, which is mild.  4. Tiny focus gliosis right precentral gyrus may represent an old  infarction. Tiny foci of T2 signal increase right thalamus and left  cerebellar hemispheric could represent old lacunar infarctions.  5. Left-sided nonspecific mastoid  effusion.      MACRO:  None      Signed by: Yung Vega 2/5/2025 7:23 PM  Dictation workstation:   [id number]       Assessment/Plan   Problem List Items Addressed This Visit             ICD-10-CM    HLD (hyperlipidemia) E78.5    Relevant Medications    atorvastatin (Lipitor) 40 mg tablet    Other Relevant Orders    AST    ALT    Lipid Panel     Other Visit Diagnoses         Codes    Dizziness    -  Primary  - upcoming appt with neurology  - ok to cancel the optho appt R42    Relevant Medications    amoxicillin-pot clavulanate (Augmentin) 875-125 mg tablet    Other Relevant Orders    Referral to ENT    Right ear pain     H92.01    Relevant Medications    amoxicillin-pot clavulanate (Augmentin) 875-125 mg tablet    Other Relevant Orders    Referral to ENT    Mastoiditis, right      - referral for ENT  - try Augmentin X 7 days'  - Meclizine for dizziness as needed   H70.91    Relevant Medications    amoxicillin-pot clavulanate (Augmentin) 875-125 mg tablet    Other Relevant Orders    Referral to ENT    Hx of TIA (transient ischemic attack) and stroke     Z86.73    Relevant Medications    atorvastatin (Lipitor) 40 mg tablet    Other Relevant Orders    AST    ALT    Lipid Panel    Vertigo     R42    Relevant Medications    meclizine (Antivert) 12.5 mg tablet    Other Relevant Orders    Referral to ENT                 Please follow up in April as scheduled or as needed.

## 2025-02-10 ENCOUNTER — APPOINTMENT (OUTPATIENT)
Dept: PRIMARY CARE | Facility: CLINIC | Age: 80
End: 2025-02-10
Payer: MEDICARE

## 2025-02-18 ENCOUNTER — APPOINTMENT (OUTPATIENT)
Dept: OPHTHALMOLOGY | Facility: CLINIC | Age: 80
End: 2025-02-18
Payer: MEDICARE

## 2025-02-25 ENCOUNTER — APPOINTMENT (OUTPATIENT)
Dept: OTOLARYNGOLOGY | Facility: CLINIC | Age: 80
End: 2025-02-25
Payer: MEDICARE

## 2025-02-25 ENCOUNTER — CLINICAL SUPPORT (OUTPATIENT)
Dept: AUDIOLOGY | Facility: CLINIC | Age: 80
End: 2025-02-25
Payer: MEDICARE

## 2025-02-25 VITALS — TEMPERATURE: 97.7 F | WEIGHT: 164 LBS | HEIGHT: 63 IN | BODY MASS INDEX: 29.06 KG/M2

## 2025-02-25 DIAGNOSIS — R42 VERTIGO: ICD-10-CM

## 2025-02-25 DIAGNOSIS — H92.01 RIGHT EAR PAIN: ICD-10-CM

## 2025-02-25 DIAGNOSIS — H69.92 DYSFUNCTION OF LEFT EUSTACHIAN TUBE: ICD-10-CM

## 2025-02-25 DIAGNOSIS — H70.12 MASTOIDITIS, CHRONIC, LEFT: Primary | ICD-10-CM

## 2025-02-25 DIAGNOSIS — R42 DIZZINESS: Primary | ICD-10-CM

## 2025-02-25 DIAGNOSIS — H70.91 MASTOIDITIS, RIGHT: ICD-10-CM

## 2025-02-25 DIAGNOSIS — R42 DIZZINESS: ICD-10-CM

## 2025-02-25 PROCEDURE — 92557 COMPREHENSIVE HEARING TEST: CPT | Performed by: AUDIOLOGIST

## 2025-02-25 PROCEDURE — 1036F TOBACCO NON-USER: CPT | Performed by: NURSE PRACTITIONER

## 2025-02-25 PROCEDURE — 1160F RVW MEDS BY RX/DR IN RCRD: CPT | Performed by: NURSE PRACTITIONER

## 2025-02-25 PROCEDURE — 1123F ACP DISCUSS/DSCN MKR DOCD: CPT | Performed by: NURSE PRACTITIONER

## 2025-02-25 PROCEDURE — 99203 OFFICE O/P NEW LOW 30 MIN: CPT | Performed by: NURSE PRACTITIONER

## 2025-02-25 PROCEDURE — 1159F MED LIST DOCD IN RCRD: CPT | Performed by: NURSE PRACTITIONER

## 2025-02-25 PROCEDURE — 92567 TYMPANOMETRY: CPT | Performed by: AUDIOLOGIST

## 2025-02-25 RX ORDER — FLUTICASONE PROPIONATE 50 MCG
1 SPRAY, SUSPENSION (ML) NASAL 2 TIMES DAILY
Qty: 16 G | Refills: 11 | Status: SHIPPED | OUTPATIENT
Start: 2025-02-25 | End: 2026-02-25

## 2025-02-25 RX ORDER — AZELASTINE 1 MG/ML
2 SPRAY, METERED NASAL EVERY EVENING
Qty: 30 ML | Refills: 11 | Status: SHIPPED | OUTPATIENT
Start: 2025-02-25 | End: 2026-02-25

## 2025-02-25 ASSESSMENT — PATIENT HEALTH QUESTIONNAIRE - PHQ9
1. LITTLE INTEREST OR PLEASURE IN DOING THINGS: NOT AT ALL
2. FEELING DOWN, DEPRESSED OR HOPELESS: NOT AT ALL
SUM OF ALL RESPONSES TO PHQ9 QUESTIONS 1 AND 2: 0

## 2025-02-25 NOTE — PROGRESS NOTES
Chief Complaint   Patient presents with    Dizziness       HISTORY:  Ely Mann, age 80 years, was seen for audiogram in conjunction with otolaryngology appointment on 2/25/2025.  Ms. Mann reports onset of short duration dizziness beginning one month ago.  She no longer experiences the dizziness.  There is no report of ear pain, ear pressure, middle ear pathology or ear surgery.    RESULTS:  Prior to testing both external auditory canals were clear and tympanic membranes visualized    Immittance and acoustic reflexes:  Immittance testing yielded TYPE A tympanograms indicating normal middle ear function both ears  Acoustic reflexes were not tested    Audiogram:  Normal hearing levels were obtained 125 - 8000 Hz both ears  Speech reception thresholds obtained at 20 dBHL both ears  Speech discrimination scores were 100% at 50 dBHL    IMPRESSIONS:  Normal middle ear function noted both ears  Normal hearing levels both ears    RECOMMENDATIONS:  1.  Follow up with otolaryngology  2.  Retest hearing levels as needed    time:  1500 - 3866

## 2025-02-25 NOTE — PROGRESS NOTES
Subjective   Patient ID: Ely Mann is a 80 y.o. female who presents for Cerumen Impaction (Right ear ), Vertigo, and fluid in ear  (Left ).  HPI  This patient is referred for evaluation of left mastoiditis.  The patient is accompanied by her daughter who is an RN.   When asked about ear pain, hearing loss, itching, discharge from ear, tinnitus, aural fullness or autophony, the patient admits to none currently.  She was in the ED 1/5/2025 for a presyncopal episode with headache.  At that time, CT head reported that his sinuses and mastoids are clear but MRI brain with and without contrast on 1/8/2025 showed white matter changes, possible old infarcts, ventriculomegaly and left mastoid effusion.  Her hospital follow-up note from her PCP endorsed left ear pain and ongoing dizziness.  Patient describes positionally triggered vertigo that seems to have resolved.  Her PCP reported fluid behind the left TM and cerumen impaction on the right.  She treated her with Augmentin.  The patient does not wear a hearing aid.  Patient denies any history of otologic surgery.    Review of Systems  A comprehensive or 10 points review of the patient's constitutional, neurological, HEENT, pulmonary, cardiovascular and genito-urinary systems showed only those mentioned in history of present illness.    Objective   Physical Exam  Constitutional: no fever, chills, weight loss or weight gain   General appearance: Appears well, well-nourished, well groomed. No acute distress.   Communication: Normal communication   Psychiatric: Oriented to person, place and time. Normal mood and affect.   Neurologic: Cranial nerves II-XII grossly intact and symmetric bilaterally.   Head and Face:   Head: Atraumatic with no masses, lesions or scarring.   Face: Normal symmetry, no paralysis, synkinesis or facial tic. No scars or deformities.     Eyes: Conjunctiva not edematous or erythematous   Ears: External inspection of ears with no deformity, scars or  masses.  Bilateral canals with minimal nonocclusive cerumen.  Both TMs intact.  On the right, no effusion or retraction noted.  Left TM is slightly retracted but no effusion noted.     Neck: Normal appearing, symmetric, trachea midline.   Cardiovascular: Examination of peripheral vascular system shows no clubbing or cyanosis.   Respiratory: No respiratory distress increased work of breathing. Inspection of the chest with symmetric chest expansion and normal respiratory effort.   Skin: No rashes in the head or neck  My interpretation of the audiogram done today is normal hearing with excellent word recognition scores bilaterally.  Normal tympanogram on the right and type C on the left.    I reviewed the images of both her recent CT and brain MRI with the patient and her daughter.  Both do show a mild/moderate left mastoid effusion.  Assessment/Plan     This patient presents for initial evaluation of chronic left mastoiditis and eustachian tube dysfunction.    Reassurance given that otologic exam today is essentially normal.  I was not able to visualize any fluid behind either TM with the microscope, but the left TM is still slightly retracted.  We discussed that typically mastoid fluid would resolve on its own in about 3 months.  However, I recommended Flonase and azelastine sprays to speed this up.  We discussed that the azelastine could make her drowsy.  Her daughter request that she only take this at bedtime.  Audiogram was reviewed in detail.  Her hearing thresholds are normal which is excellent given her age.  The only abnormality is mild negative pressure behind the left TM.  Fortunately, she no longer has symptoms from this.  Again, nasal sprays will help correct this faster.  She may follow-up with me as needed.  All questions were answered to patient and family's satisfaction.    This note was created using speech recognition transcription software. Despite proofreading, several typographical errors might be  present that might affect the meaning of the content. Please call with any questions.         MAHAD Colvin-CNP 02/25/25 4:17 PM

## 2025-03-20 ENCOUNTER — APPOINTMENT (OUTPATIENT)
Dept: PHARMACY | Facility: HOSPITAL | Age: 80
End: 2025-03-20
Payer: MEDICARE

## 2025-04-04 ENCOUNTER — APPOINTMENT (OUTPATIENT)
Dept: PRIMARY CARE | Facility: CLINIC | Age: 80
End: 2025-04-04
Payer: MEDICARE

## 2025-04-04 VITALS
WEIGHT: 166 LBS | HEART RATE: 77 BPM | RESPIRATION RATE: 12 BRPM | SYSTOLIC BLOOD PRESSURE: 120 MMHG | BODY MASS INDEX: 29.41 KG/M2 | TEMPERATURE: 97 F | OXYGEN SATURATION: 97 % | HEIGHT: 63 IN | DIASTOLIC BLOOD PRESSURE: 60 MMHG

## 2025-04-04 DIAGNOSIS — I10 BENIGN ESSENTIAL HYPERTENSION: Primary | ICD-10-CM

## 2025-04-04 PROCEDURE — 3074F SYST BP LT 130 MM HG: CPT | Performed by: FAMILY MEDICINE

## 2025-04-04 PROCEDURE — 1123F ACP DISCUSS/DSCN MKR DOCD: CPT | Performed by: FAMILY MEDICINE

## 2025-04-04 PROCEDURE — 99212 OFFICE O/P EST SF 10 MIN: CPT | Performed by: FAMILY MEDICINE

## 2025-04-04 PROCEDURE — 1159F MED LIST DOCD IN RCRD: CPT | Performed by: FAMILY MEDICINE

## 2025-04-04 PROCEDURE — G2211 COMPLEX E/M VISIT ADD ON: HCPCS | Performed by: FAMILY MEDICINE

## 2025-04-04 PROCEDURE — 3078F DIAST BP <80 MM HG: CPT | Performed by: FAMILY MEDICINE

## 2025-04-04 PROCEDURE — 1036F TOBACCO NON-USER: CPT | Performed by: FAMILY MEDICINE

## 2025-04-04 PROCEDURE — 1160F RVW MEDS BY RX/DR IN RCRD: CPT | Performed by: FAMILY MEDICINE

## 2025-04-04 NOTE — ASSESSMENT & PLAN NOTE
- Your blood pressure is at goal today- goal is less than 130/80  - Continue your current medications  - Weight loss can help lower your bp!  Work on a healthy whole food diet and add at least 30min of exercise 5 days per week  - Work on a low salt diet

## 2025-04-04 NOTE — PROGRESS NOTES
"Subjective   Patient ID: Ely Mann is a 80 y.o. female who presents for Follow-up (HTN).    HPI     HTN  BP at goal today in office.  Using medications without issues.  Denies CP, SOB, palpitations, change in vision, dizziness, N/V.    Was with Ear pain and dizziness has resolved after abx use  She states she is doing well    Review of Systems    All systems reviewed and neg if not noted in the HPI above       Objective   /60 (Patient Position: Sitting)   Pulse 77   Temp 36.1 °C (97 °F)   Resp 12   Ht 1.6 m (5' 3\")   Wt 75.3 kg (166 lb)   SpO2 97%   BMI 29.41 kg/m²     Physical Exam  Vitals reviewed.   Constitutional:       Appearance: Normal appearance.   HENT:      Head: Normocephalic.      Right Ear: Hearing, tympanic membrane and ear canal normal. No decreased hearing noted. No swelling. No middle ear effusion. There is no impacted cerumen. No foreign body. Tympanic membrane is not injected.      Left Ear: Hearing, tympanic membrane and ear canal normal. No decreased hearing noted. No swelling.  No middle ear effusion. There is no impacted cerumen. No foreign body. Tympanic membrane is not injected.      Nose: Congestion present. No nasal deformity or signs of injury.      Right Turbinates: Swollen. Not pale.      Left Turbinates: Swollen. Not pale.      Right Sinus: No maxillary sinus tenderness or frontal sinus tenderness.      Left Sinus: No maxillary sinus tenderness or frontal sinus tenderness.      Mouth/Throat:      Mouth: Mucous membranes are moist. No injury or lacerations.      Tongue: No lesions. Tongue does not deviate from midline.   Eyes:      Extraocular Movements: Extraocular movements intact.   Cardiovascular:      Rate and Rhythm: Normal rate and regular rhythm.      Heart sounds: Normal heart sounds. No murmur heard.  Pulmonary:      Effort: Pulmonary effort is normal. No respiratory distress.      Breath sounds: Normal breath sounds. No wheezing.   Abdominal:      General: " Bowel sounds are normal. There is no distension.      Palpations: Abdomen is soft.   Skin:     General: Skin is warm and dry.   Neurological:      General: No focal deficit present.      Mental Status: She is alert and oriented to person, place, and time.   Psychiatric:         Mood and Affect: Mood normal.         Behavior: Behavior normal.         Thought Content: Thought content normal.         Judgment: Judgment normal.         Assessment/Plan   Assessment & Plan  Benign essential hypertension  - Your blood pressure is at goal today- goal is less than 130/80  - Continue your current medications  - Weight loss can help lower your bp!  Work on a healthy whole food diet and add at least 30min of exercise 5 days per week  - Work on a low salt diet          Please follow up in   - Sept as scheduled for medicare wellness  - or as needed.

## 2025-04-04 NOTE — PATIENT INSTRUCTIONS
Labs today    HTN  - Your blood pressure is at goal today- goal is less than 130/80  - Continue your current medications  - Weight loss can help lower your bp!  Work on a healthy whole food diet and add at least 30min of exercise 5 days per week  - Work on a low salt diet        Please follow up in   - Sept as scheduled for medicare wellness  - or as needed.       ** If labs or imaging ordered at today's visit, all the non-urgent results will be discussed at your next visit    If you have been referred for a special test or to a specialist please call  [phone] to schedule an appointment.  If you have any further questions, or if develop new or worsened symptoms, please give our office a call at [phone].

## 2025-05-04 ENCOUNTER — APPOINTMENT (OUTPATIENT)
Dept: RADIOLOGY | Facility: HOSPITAL | Age: 80
End: 2025-05-04
Payer: MEDICARE

## 2025-05-04 ENCOUNTER — APPOINTMENT (OUTPATIENT)
Dept: CARDIOLOGY | Facility: HOSPITAL | Age: 80
End: 2025-05-04
Payer: MEDICARE

## 2025-05-04 ENCOUNTER — HOSPITAL ENCOUNTER (EMERGENCY)
Facility: HOSPITAL | Age: 80
Discharge: HOME | End: 2025-05-04
Attending: EMERGENCY MEDICINE
Payer: MEDICARE

## 2025-05-04 VITALS
WEIGHT: 165 LBS | BODY MASS INDEX: 29.23 KG/M2 | HEART RATE: 66 BPM | HEIGHT: 63 IN | RESPIRATION RATE: 18 BRPM | SYSTOLIC BLOOD PRESSURE: 126 MMHG | TEMPERATURE: 98.2 F | OXYGEN SATURATION: 98 % | DIASTOLIC BLOOD PRESSURE: 72 MMHG

## 2025-05-04 DIAGNOSIS — R42 DIZZINESS: ICD-10-CM

## 2025-05-04 DIAGNOSIS — R55 NEAR SYNCOPE: Primary | ICD-10-CM

## 2025-05-04 LAB
ALBUMIN SERPL BCP-MCNC: 4.5 G/DL (ref 3.4–5)
ALP SERPL-CCNC: 84 U/L (ref 33–136)
ALT SERPL W P-5'-P-CCNC: 21 U/L (ref 7–45)
ANION GAP SERPL CALC-SCNC: 12 MMOL/L (ref 10–20)
APPEARANCE UR: CLEAR
AST SERPL W P-5'-P-CCNC: 28 U/L (ref 9–39)
BASOPHILS # BLD AUTO: 0.02 X10*3/UL (ref 0–0.1)
BASOPHILS NFR BLD AUTO: 0.2 %
BILIRUB SERPL-MCNC: 0.8 MG/DL (ref 0–1.2)
BILIRUB UR STRIP.AUTO-MCNC: NEGATIVE MG/DL
BUN SERPL-MCNC: 12 MG/DL (ref 6–23)
CALCIUM SERPL-MCNC: 9 MG/DL (ref 8.6–10.3)
CARDIAC TROPONIN I PNL SERPL HS: 14 NG/L (ref 0–13)
CARDIAC TROPONIN I PNL SERPL HS: 16 NG/L (ref 0–13)
CHLORIDE SERPL-SCNC: 102 MMOL/L (ref 98–107)
CO2 SERPL-SCNC: 25 MMOL/L (ref 21–32)
COLOR UR: ABNORMAL
CREAT SERPL-MCNC: 1.06 MG/DL (ref 0.5–1.05)
EGFRCR SERPLBLD CKD-EPI 2021: 53 ML/MIN/1.73M*2
EOSINOPHIL # BLD AUTO: 0.01 X10*3/UL (ref 0–0.4)
EOSINOPHIL NFR BLD AUTO: 0.1 %
ERYTHROCYTE [DISTWIDTH] IN BLOOD BY AUTOMATED COUNT: 13 % (ref 11.5–14.5)
GLUCOSE SERPL-MCNC: 113 MG/DL (ref 74–99)
GLUCOSE UR STRIP.AUTO-MCNC: NORMAL MG/DL
HCT VFR BLD AUTO: 39.2 % (ref 36–46)
HGB BLD-MCNC: 13.6 G/DL (ref 12–16)
IMM GRANULOCYTES # BLD AUTO: 0.03 X10*3/UL (ref 0–0.5)
IMM GRANULOCYTES NFR BLD AUTO: 0.4 % (ref 0–0.9)
KETONES UR STRIP.AUTO-MCNC: NEGATIVE MG/DL
LEUKOCYTE ESTERASE UR QL STRIP.AUTO: NEGATIVE
LYMPHOCYTES # BLD AUTO: 1.95 X10*3/UL (ref 0.8–3)
LYMPHOCYTES NFR BLD AUTO: 22.8 %
MAGNESIUM SERPL-MCNC: 1.89 MG/DL (ref 1.6–2.4)
MCH RBC QN AUTO: 33.1 PG (ref 26–34)
MCHC RBC AUTO-ENTMCNC: 34.7 G/DL (ref 32–36)
MCV RBC AUTO: 95 FL (ref 80–100)
MONOCYTES # BLD AUTO: 0.36 X10*3/UL (ref 0.05–0.8)
MONOCYTES NFR BLD AUTO: 4.2 %
NEUTROPHILS # BLD AUTO: 6.2 X10*3/UL (ref 1.6–5.5)
NEUTROPHILS NFR BLD AUTO: 72.3 %
NITRITE UR QL STRIP.AUTO: NEGATIVE
NRBC BLD-RTO: 0 /100 WBCS (ref 0–0)
PH UR STRIP.AUTO: 6 [PH]
PLATELET # BLD AUTO: 283 X10*3/UL (ref 150–450)
POTASSIUM SERPL-SCNC: 3.3 MMOL/L (ref 3.5–5.3)
PROT SERPL-MCNC: 7.1 G/DL (ref 6.4–8.2)
PROT UR STRIP.AUTO-MCNC: NEGATIVE MG/DL
RBC # BLD AUTO: 4.11 X10*6/UL (ref 4–5.2)
RBC # UR STRIP.AUTO: NEGATIVE MG/DL
SODIUM SERPL-SCNC: 136 MMOL/L (ref 136–145)
SP GR UR STRIP.AUTO: 1
UROBILINOGEN UR STRIP.AUTO-MCNC: NORMAL MG/DL
WBC # BLD AUTO: 8.6 X10*3/UL (ref 4.4–11.3)

## 2025-05-04 PROCEDURE — 84484 ASSAY OF TROPONIN QUANT: CPT | Performed by: PHYSICIAN ASSISTANT

## 2025-05-04 PROCEDURE — 83735 ASSAY OF MAGNESIUM: CPT | Performed by: PHYSICIAN ASSISTANT

## 2025-05-04 PROCEDURE — 70450 CT HEAD/BRAIN W/O DYE: CPT | Performed by: RADIOLOGY

## 2025-05-04 PROCEDURE — 71046 X-RAY EXAM CHEST 2 VIEWS: CPT

## 2025-05-04 PROCEDURE — 71046 X-RAY EXAM CHEST 2 VIEWS: CPT | Performed by: RADIOLOGY

## 2025-05-04 PROCEDURE — 81003 URINALYSIS AUTO W/O SCOPE: CPT | Performed by: PHYSICIAN ASSISTANT

## 2025-05-04 PROCEDURE — 99285 EMERGENCY DEPT VISIT HI MDM: CPT | Mod: 25 | Performed by: EMERGENCY MEDICINE

## 2025-05-04 PROCEDURE — 93005 ELECTROCARDIOGRAM TRACING: CPT

## 2025-05-04 PROCEDURE — 85025 COMPLETE CBC W/AUTO DIFF WBC: CPT | Performed by: PHYSICIAN ASSISTANT

## 2025-05-04 PROCEDURE — 2500000004 HC RX 250 GENERAL PHARMACY W/ HCPCS (ALT 636 FOR OP/ED): Mod: JZ | Performed by: EMERGENCY MEDICINE

## 2025-05-04 PROCEDURE — 70450 CT HEAD/BRAIN W/O DYE: CPT

## 2025-05-04 PROCEDURE — 96360 HYDRATION IV INFUSION INIT: CPT

## 2025-05-04 PROCEDURE — 36415 COLL VENOUS BLD VENIPUNCTURE: CPT | Performed by: PHYSICIAN ASSISTANT

## 2025-05-04 PROCEDURE — 80053 COMPREHEN METABOLIC PANEL: CPT | Performed by: PHYSICIAN ASSISTANT

## 2025-05-04 RX ADMIN — SODIUM CHLORIDE 1000 ML: 0.9 INJECTION, SOLUTION INTRAVENOUS at 19:59

## 2025-05-04 ASSESSMENT — COLUMBIA-SUICIDE SEVERITY RATING SCALE - C-SSRS
1. IN THE PAST MONTH, HAVE YOU WISHED YOU WERE DEAD OR WISHED YOU COULD GO TO SLEEP AND NOT WAKE UP?: NO
6. HAVE YOU EVER DONE ANYTHING, STARTED TO DO ANYTHING, OR PREPARED TO DO ANYTHING TO END YOUR LIFE?: NO
2. HAVE YOU ACTUALLY HAD ANY THOUGHTS OF KILLING YOURSELF?: NO

## 2025-05-04 ASSESSMENT — PAIN SCALES - GENERAL: PAINLEVEL_OUTOF10: 0 - NO PAIN

## 2025-05-04 ASSESSMENT — PAIN - FUNCTIONAL ASSESSMENT: PAIN_FUNCTIONAL_ASSESSMENT: 0-10

## 2025-05-04 NOTE — ED TRIAGE NOTES
Dizziness and a near syncopal episode X2 this morning with a fall. She states that she gets real  dizzy and looses her balance. She also c/o of left ear pain, and dry mouth.  Denies blood thinner.

## 2025-05-04 NOTE — ED TRIAGE NOTES
As provider-in-triage, I performed a medical screening history and physical exam on this patient.  HISTORY OF PRESENT ILLNESS  (include at least one item)     Dizzy described as room spinning to where she went to the ground daughter also gives history and states had near syncopal episode twice this morning.  She did not get injured from the fall but does lose her balance also complains of left ear pain and dry mouth she has been on antibiotics after an MRI showed fluid on the left ear remotely    PHYSICAL EXAM  Vital Signs reviewed.  (include at least one additional item)     Awake alert oriented pleasant interactive no gross deficits left ear generally appears well.  No nystagmus.    MDM  (describe briefly what was initiated or planned)    Cardiac with head CT offered meclizine but just took 1 prior to arrival    I evaluated this patient in triage with the RN. Due to the patients complaint labs and or imaging were ordered by myself in an attempt to expedite patient care however I am not participating in care after evaluation. This is a preliminary assessment. Pt does not appear in acute distress at this time. They will have a full evaluation as soon as possible. They will be cared for by another provider who will possibly order more labs, imaging or interventions. Pt did not have a full ROS or PE completed by myself however below is a summary with reasons for orders.  For the remainder of the patient's workup and ED course, please refer to the main ED provider note. We discussed need for diagnostic testing including laboratory studies and imaging.  We also discussed that patient may be asked to wait in the waiting room while these tests are pending.  They understand that if they choose to leave without having the testing completed or resulted that we cannot rule out acute life threatening illnesses and the risks involved could lead to worsening condition, permanent disability or even death.

## 2025-05-04 NOTE — ED PROVIDER NOTES
Emergency Department Provider Note       History of Present Illness     History provided by: Patient  Limitations to History: None  External Records Reviewed with Brief Summary: None    HPI:  Chief complaint: Dizziness, syncope    History of present illness: Patient is an 80-year-old female with history of hypertension chronic kidney disease hyperlipidemia hyperparathyroidism presenting to the emergency department with complaints of dizziness and syncope.  According to the patient's family, she started experiencing symptoms yesterday.  While the patient was going to the bathroom she became lightheaded and lowered self to the ground.  Patient had another episode of this this morning while getting ready for [employer].  This afternoon after [employer], the patient has had several episodes of lightheadedness particularly when she sits up.  The patient denies any pain with this.  She states that she feels otherwise well.  The patient was recently diagnosed with a fluid in her left ear she was given antibiotics and completed these antibiotics.  Concern, the patient presents to the emergency department for further evaluation she states she otherwise feels quite well at this time.    Physical Exam   Triage vitals:  T 36.8 °C (98.2 °F)  HR 97  /73  RR 18  O2 98 % None (Room air)    Physical Exam  Constitutional:       Appearance: Normal appearance.   HENT:      Head: Normocephalic and atraumatic.      Right Ear: Tympanic membrane and external ear normal. No middle ear effusion. Tympanic membrane is not injected.      Left Ear: Tympanic membrane and external ear normal.  No middle ear effusion. Tympanic membrane is not injected.      Nose: Nose normal.      Mouth/Throat:      Mouth: Mucous membranes are moist.   Eyes:      General: Lids are normal.      Extraocular Movements: Extraocular movements intact.      Pupils: Pupils are equal, round, and reactive to light.   Cardiovascular:      Rate and Rhythm: Normal rate and  regular rhythm.      Heart sounds: Normal heart sounds.   Pulmonary:      Effort: Pulmonary effort is normal.      Breath sounds: Normal breath sounds.   Abdominal:      General: Abdomen is flat.      Palpations: Abdomen is soft.      Tenderness: There is no abdominal tenderness. There is no guarding or rebound.   Musculoskeletal:         General: No deformity. Normal range of motion.      Cervical back: Normal range of motion and neck supple.   Skin:     General: Skin is warm.      Capillary Refill: Capillary refill takes less than 2 seconds.      Coloration: Skin is not jaundiced.   Neurological:      General: No focal deficit present.      Mental Status: She is alert and oriented to person, place, and time.   Psychiatric:         Mood and Affect: Mood normal.         Behavior: Behavior normal.           Medical Decision Making & ED Course   Medical Decision Making:  Medical decision making: Patient remained stable during my time with her in the emergency department.  CBC demonstrated no significant abnormalities.  The patient's Chem-7 and LFTs were all within normal limits.  Magnesium is normal.  Troponin and delta troponin were essentially unchanged at 16 and 14 respectively.  Urinalysis demonstrated no significant abnormalities CAT scan of the patient's head without IV contrast demonstrated no significant acute abnormalities chest x-ray demonstrated no significant acute abnormalities.  The patient's EKG demonstrated a normal sinus rhythm with a rate of 87 bpm isoelectric ST segments a right bundle branch block and a QTc of 488.  Patient's repeat EKG demonstrated normal sinus rhythm with a rate of 68 bpm isoelectric ST segments right bundle branch block and a QTc of 489.    Patient presents to the emergency department with complaints of a syncopal episode.  Workup was performed as above and demonstrated no significant abnormalities.  The patient and family were reassured patient was given a liter of normal  saline there instructed to follow-up with a primary care physician and to return to the emergency department they have any worsening symptoms patient expressed understanding and agreement.  Patient was then discharged home in otherwise stable condition custody of her family.  Disposition   As a result of the work-up, the patient was discharged home.  she was informed of her diagnosis and instructed to come back with any concerns or worsening of condition.  she and was agreeable to the plan as discussed above.  she was given the opportunity to ask questions.  All of the patient's questions were answered.    Procedures   Procedures    Patient was seen independently    Bharat Encinas MD  Emergency Medicine                                                            Bhaart Encinas MD  05/09/25 2281

## 2025-05-05 DIAGNOSIS — E78.5 HYPERLIPIDEMIA, UNSPECIFIED HYPERLIPIDEMIA TYPE: ICD-10-CM

## 2025-05-05 DIAGNOSIS — R42 VERTIGO: ICD-10-CM

## 2025-05-05 DIAGNOSIS — Z86.73 HX OF TIA (TRANSIENT ISCHEMIC ATTACK) AND STROKE: ICD-10-CM

## 2025-05-05 NOTE — TELEPHONE ENCOUNTER
Dalia the patients daughter is calling to ask for a refill of meclizine (Antivert) 12.5 mg tablet   She stated she is completely out of the medication.    She also called to let Dr. Jeronimo and let her know she was just in the ED for dizziness.     Do you want to see her for a FUV?    Please advise, TY

## 2025-05-07 DIAGNOSIS — E78.5 HYPERLIPIDEMIA, UNSPECIFIED HYPERLIPIDEMIA TYPE: ICD-10-CM

## 2025-05-07 DIAGNOSIS — Z86.73 HX OF TIA (TRANSIENT ISCHEMIC ATTACK) AND STROKE: ICD-10-CM

## 2025-05-07 NOTE — TELEPHONE ENCOUNTER
Patient insurance company lv patient is still waiting for her refill for rx atorvastatin (Lipitor) 40 mg tablet      Please send to     [facility]/pharmacy #[id number] - [facility], [place] - [address], AT [facility] Phone: [phone]   Fax: [phone]          Thank you.

## 2025-05-08 LAB
ALT SERPL-CCNC: 18 U/L (ref 6–29)
AST SERPL-CCNC: 22 U/L (ref 10–35)
CHOLEST SERPL-MCNC: 173 MG/DL
CHOLEST/HDLC SERPL: 2 (CALC)
HDLC SERPL-MCNC: 85 MG/DL
LDLC SERPL CALC-MCNC: 73 MG/DL (CALC)
NONHDLC SERPL-MCNC: 88 MG/DL (CALC)
TRIGL SERPL-MCNC: 71 MG/DL

## 2025-05-08 RX ORDER — MECLIZINE HCL 12.5 MG 12.5 MG/1
12.5-25 TABLET ORAL 3 TIMES DAILY PRN
Qty: 30 TABLET | Refills: 0 | Status: SHIPPED | OUTPATIENT
Start: 2025-05-08

## 2025-05-08 RX ORDER — ATORVASTATIN CALCIUM 40 MG/1
40 TABLET, FILM COATED ORAL NIGHTLY
Qty: 90 TABLET | Refills: 1 | Status: SHIPPED | OUTPATIENT
Start: 2025-05-08 | End: 2025-08-06

## 2025-05-09 LAB
ATRIAL RATE: 68 BPM
ATRIAL RATE: 87 BPM
P AXIS: 26 DEGREES
P AXIS: 46 DEGREES
P OFFSET: 169 MS
P OFFSET: 179 MS
P ONSET: 122 MS
P ONSET: 132 MS
PR INTERVAL: 158 MS
PR INTERVAL: 172 MS
Q ONSET: 208 MS
Q ONSET: 211 MS
QRS COUNT: 12 BEATS
QRS COUNT: 14 BEATS
QRS DURATION: 142 MS
QRS DURATION: 148 MS
QT INTERVAL: 406 MS
QT INTERVAL: 460 MS
QTC CALCULATION(BAZETT): 488 MS
QTC CALCULATION(BAZETT): 489 MS
QTC FREDERICIA: 459 MS
QTC FREDERICIA: 479 MS
R AXIS: -66 DEGREES
R AXIS: -85 DEGREES
T AXIS: 10 DEGREES
T AXIS: 28 DEGREES
T OFFSET: 414 MS
T OFFSET: 438 MS
VENTRICULAR RATE: 68 BPM
VENTRICULAR RATE: 87 BPM

## 2025-05-12 ENCOUNTER — TELEPHONE (OUTPATIENT)
Dept: PRIMARY CARE | Facility: CLINIC | Age: 80
End: 2025-05-12

## 2025-05-12 NOTE — TELEPHONE ENCOUNTER
Dalia (daughter) states mom was in ER on 5/4/25 for Dizziness and fluid behind ears. CT of head was done, everything showed normal. Dalia states mom still having dizziness/Ear pain/Headaches/confusion and not feeling well. Please advice.

## 2025-05-14 ENCOUNTER — OFFICE VISIT (OUTPATIENT)
Dept: PRIMARY CARE | Facility: CLINIC | Age: 80
End: 2025-05-14
Payer: MEDICARE

## 2025-05-14 VITALS
BODY MASS INDEX: 28.53 KG/M2 | SYSTOLIC BLOOD PRESSURE: 110 MMHG | WEIGHT: 161 LBS | OXYGEN SATURATION: 94 % | TEMPERATURE: 97 F | RESPIRATION RATE: 14 BRPM | HEART RATE: 82 BPM | DIASTOLIC BLOOD PRESSURE: 60 MMHG | HEIGHT: 63 IN

## 2025-05-14 DIAGNOSIS — E87.6 HYPOKALEMIA: ICD-10-CM

## 2025-05-14 DIAGNOSIS — R42 DIZZINESS: ICD-10-CM

## 2025-05-14 DIAGNOSIS — M26.609 TMJ (TEMPOROMANDIBULAR JOINT DISORDER): ICD-10-CM

## 2025-05-14 DIAGNOSIS — R55 SYNCOPE, UNSPECIFIED SYNCOPE TYPE: Primary | ICD-10-CM

## 2025-05-14 PROCEDURE — G2212 PROLONG OUTPT/OFFICE VIS: HCPCS | Performed by: FAMILY MEDICINE

## 2025-05-14 PROCEDURE — 1036F TOBACCO NON-USER: CPT | Performed by: FAMILY MEDICINE

## 2025-05-14 PROCEDURE — 99215 OFFICE O/P EST HI 40 MIN: CPT | Performed by: FAMILY MEDICINE

## 2025-05-14 PROCEDURE — 1159F MED LIST DOCD IN RCRD: CPT | Performed by: FAMILY MEDICINE

## 2025-05-14 PROCEDURE — 3074F SYST BP LT 130 MM HG: CPT | Performed by: FAMILY MEDICINE

## 2025-05-14 PROCEDURE — 3078F DIAST BP <80 MM HG: CPT | Performed by: FAMILY MEDICINE

## 2025-05-14 PROCEDURE — G2211 COMPLEX E/M VISIT ADD ON: HCPCS | Performed by: FAMILY MEDICINE

## 2025-05-14 RX ORDER — METHYLPREDNISOLONE 4 MG/1
TABLET ORAL
Qty: 21 TABLET | Refills: 0 | Status: SHIPPED | OUTPATIENT
Start: 2025-05-14 | End: 2025-05-14

## 2025-05-14 RX ORDER — METHYLPREDNISOLONE 4 MG/1
TABLET ORAL
Qty: 21 TABLET | Refills: 0 | Status: SHIPPED | OUTPATIENT
Start: 2025-05-14 | End: 2025-05-20

## 2025-05-14 ASSESSMENT — ANXIETY QUESTIONNAIRES
3. WORRYING TOO MUCH ABOUT DIFFERENT THINGS: NOT AT ALL
GAD7 TOTAL SCORE: 0
7. FEELING AFRAID AS IF SOMETHING AWFUL MIGHT HAPPEN: NOT AT ALL
2. NOT BEING ABLE TO STOP OR CONTROL WORRYING: NOT AT ALL
1. FEELING NERVOUS, ANXIOUS, OR ON EDGE: NOT AT ALL
6. BECOMING EASILY ANNOYED OR IRRITABLE: NOT AT ALL
5. BEING SO RESTLESS THAT IT IS HARD TO SIT STILL: NOT AT ALL
4. TROUBLE RELAXING: NOT AT ALL

## 2025-05-14 NOTE — PROGRESS NOTES
"Subjective   Patient ID: Ely Mann is a 80 y.o. female who presents for Follow-up (ER fuv-Dizziness/Ear pain/Headache).    HPI   Patient went to the ER on 5/4/2025 for dizziness and syncope.  She started experiencing symptoms the day before while going to the bathroom became lightheaded and lowered herself to the ground.  It was with another episode the next day while getting ready for [employer] and then after [employer] had several episodes of lightheadedness especially when she sits up.    In the past she had fluid and AOM with mastitis was given antibiotics and was feeling better there after. Was sent to ENT- was advised to use asteline with flonase- never tried bc she got better.    She is still with dizziness, left ear pain, headaches and neck pain  Also with TMJ and jaw clicking b/l.  States these symptoms worsened after airline travel 5/1/25 (return flight)- noting that this happen in the past as well        Review of Systems  All systems reviewed and neg if not noted in the HPI above       Objective   /60 (Patient Position: Sitting)   Pulse 82   Temp 36.1 °C (97 °F)   Resp 14   Ht 1.6 m (5' 3\")   Wt 73 kg (161 lb)   SpO2 94%   BMI 28.52 kg/m²     Physical Exam  Constitutional:       Appearance: Normal appearance.   HENT:      Head: Normocephalic.   Eyes:      Extraocular Movements: Extraocular movements intact.      Conjunctiva/sclera: Conjunctivae normal.      Pupils: Pupils are equal, round, and reactive to light.   Pulmonary:      Effort: Pulmonary effort is normal.   Musculoskeletal:         General: No deformity or signs of injury.   Skin:     Coloration: Skin is not jaundiced.      Findings: No rash.   Neurological:      Mental Status: She is alert and oriented to person, place, and time. Mental status is at baseline.   Psychiatric:         Mood and Affect: Mood normal.               Assessment/Plan   Assessment & Plan  Syncope, unspecified syncope type  Dizziness  - With TMJ exercises " given  - Try medrol pack  - Restart the Asteline with the Flonase  - Prior to air travel ok to try afrin and use airplane pod  - To call for ENT follow up    Orders:    CBC and Auto Differential; Future    Hypokalemia    Orders:    Comprehensive Metabolic Panel; Future    TMJ (temporomandibular joint disorder)    Orders:    methylPREDNISolone (Medrol Dospak) 4 mg tablets; Take as directed on package.      Follow up as scheduled     .

## 2025-05-14 NOTE — TELEPHONE ENCOUNTER
Instead of [facility] home delivery patient would medication sent to   [facility]/pharm[facility] #[id number] - [facility], [place] - [address], AT [facility] Phone: [phone]   Fax: [phone]      methylPREDNISolone (Medrol Dospak) 4 mg tablets

## 2025-05-14 NOTE — PATIENT INSTRUCTIONS
Ear/ dizziness/neck  - With TMJ exercises given  - Try medrol pack  - Restart the Asteline with the Flonase  - Prior to air travel ok to try afrin and use airplane pod  - To call for ENT follow up      Follow up as scheduled

## 2025-05-15 LAB
ALBUMIN SERPL-MCNC: 4.7 G/DL (ref 3.6–5.1)
ALP SERPL-CCNC: 79 U/L (ref 37–153)
ALT SERPL-CCNC: 15 U/L (ref 6–29)
ANION GAP SERPL CALCULATED.4IONS-SCNC: 10 MMOL/L (CALC) (ref 7–17)
AST SERPL-CCNC: 19 U/L (ref 10–35)
BASOPHILS # BLD AUTO: 43 CELLS/UL (ref 0–200)
BASOPHILS NFR BLD AUTO: 0.6 %
BILIRUB SERPL-MCNC: 0.7 MG/DL (ref 0.2–1.2)
BUN SERPL-MCNC: 13 MG/DL (ref 7–25)
CALCIUM SERPL-MCNC: 9.3 MG/DL (ref 8.6–10.4)
CHLORIDE SERPL-SCNC: 98 MMOL/L (ref 98–110)
CO2 SERPL-SCNC: 27 MMOL/L (ref 20–32)
CREAT SERPL-MCNC: 1.15 MG/DL (ref 0.6–0.95)
EGFRCR SERPLBLD CKD-EPI 2021: 48 ML/MIN/1.73M2
EOSINOPHIL # BLD AUTO: 43 CELLS/UL (ref 15–500)
EOSINOPHIL NFR BLD AUTO: 0.6 %
ERYTHROCYTE [DISTWIDTH] IN BLOOD BY AUTOMATED COUNT: 12 % (ref 11–15)
GLUCOSE SERPL-MCNC: 99 MG/DL (ref 65–139)
HCT VFR BLD AUTO: 43.4 % (ref 35–45)
HGB BLD-MCNC: 14.2 G/DL (ref 11.7–15.5)
LYMPHOCYTES # BLD AUTO: 2174 CELLS/UL (ref 850–3900)
LYMPHOCYTES NFR BLD AUTO: 30.2 %
MCH RBC QN AUTO: 32.9 PG (ref 27–33)
MCHC RBC AUTO-ENTMCNC: 32.7 G/DL (ref 32–36)
MCV RBC AUTO: 100.7 FL (ref 80–100)
MONOCYTES # BLD AUTO: 389 CELLS/UL (ref 200–950)
MONOCYTES NFR BLD AUTO: 5.4 %
NEUTROPHILS # BLD AUTO: 4550 CELLS/UL (ref 1500–7800)
NEUTROPHILS NFR BLD AUTO: 63.2 %
PLATELET # BLD AUTO: 265 THOUSAND/UL (ref 140–400)
PMV BLD REES-ECKER: 10.1 FL (ref 7.5–12.5)
POTASSIUM SERPL-SCNC: 4.5 MMOL/L (ref 3.5–5.3)
PROT SERPL-MCNC: 7.6 G/DL (ref 6.1–8.1)
RBC # BLD AUTO: 4.31 MILLION/UL (ref 3.8–5.1)
SODIUM SERPL-SCNC: 135 MMOL/L (ref 135–146)
WBC # BLD AUTO: 7.2 THOUSAND/UL (ref 3.8–10.8)

## 2025-05-16 ENCOUNTER — APPOINTMENT (OUTPATIENT)
Dept: PRIMARY CARE | Facility: CLINIC | Age: 80
End: 2025-05-16
Payer: MEDICARE

## 2025-05-21 ENCOUNTER — OFFICE VISIT (OUTPATIENT)
Dept: OTOLARYNGOLOGY | Facility: CLINIC | Age: 80
End: 2025-05-21
Payer: MEDICARE

## 2025-05-21 DIAGNOSIS — H81.12 BENIGN PAROXYSMAL POSITIONAL VERTIGO OF LEFT EAR: Primary | ICD-10-CM

## 2025-05-21 NOTE — PROGRESS NOTES
History Of Present Illness  Ely Mann is a 80 y.o. female, who presents for dizziness.  She is kindly referred by Dr. Jeronimo.    The patient presented to the ER on 5/4/2025 for dizziness and syncope. Symptoms began the previous day while she was using the bathroom--she became lightheaded and had to lower herself to the ground. The following day, she experienced another episode while getting ready for [employer], followed by several additional episodes of lightheadedness, particularly upon sitting up, including after attending [employer].    She has a history of fluid in the ear and acute otitis media with mastoiditis and was treated with antibiotics, after which her symptoms initially improved. She was referred to ENT and advised to use Astepro (azelastine) with Flonase (fluticasone), but she did not start the treatment as her symptoms had resolved at that time.    She continues to experience dizziness, left ear pain, headaches, and neck pain. She also reports bilateral TMJ symptoms with jaw clicking. She notes that these symptoms worsened after airline travel on 5/1/2025 (return flight), similar to prior episodes following air travel.    She has used oral medrol dose pack recently. She still feels dizzy off and on. Takes meclizine twice a day.     She still has positional short lasting dizziness, when she lays on her left side.  No ear fullness  No tinnitus.    On examination, TMs look intact.  [place] Hallpike maneuver has shown vertigo with nystagmus at left. Left Epley maneuver was performed.  It did not help with vertigo and/or nystagmus.  Left J maneuver was performed.    Plan  1-BPPV recommendations  2-follow-up in 1 week      Past Medical History  She has a past medical history of Body mass index (BMI) 31.0-31.9, adult (05/21/2021), Conjunctival hemorrhage, right eye (05/21/2021), Encounter for immunization (05/21/2021), Encounter for immunization (05/21/2021), Encounter for other screening for malignant neoplasm of  breast (05/21/2021), Encounter for screening mammogram for malignant neoplasm of breast (05/21/2021), Other bursal cyst, unspecified site (10/27/2015), Other conditions influencing health status, Other injuries of right eye and orbit, sequela (05/21/2021), Other myositis, unspecified ankle and foot (05/21/2021), Other specified cough (05/21/2021), Pain in right leg (05/21/2021), Personal history of other diseases of the respiratory system (05/21/2021), Personal history of other diseases of the respiratory system, Personal history of other drug therapy (05/21/2021), Personal history of other medical treatment, Personal history of other specified conditions, and Personal history of other specified conditions (04/13/2020).    Surgical History  She has a past surgical history that includes Total abdominal hysterectomy (10/07/2021).     Social History  She reports that she has never smoked. She has never used smokeless tobacco. She reports that she does not currently use alcohol. She reports that she does not use drugs.    Family History  Family History[1]     Allergies  Ace inhibitors, Arb-angiotensin receptor antagonist, Aspirin, Caffeine, and Trolamine salicylate    Review of Systems        Physical Exam    General appearance: Healthy-appearing, well-nourished, well groomed, in no acute distress.     Head and Face: Atraumatic with no masses, lesions, or scarring.      Salivary glands: No tenderness of the parotid glands or parotid masses.     No tenderness of the submandibular glands or submandibular masses.      Facial strength: Normal strength and symmetry, no synkinesis or facial tic.     Eyes: Conjunctivas look non-hyperemic bilaterally    Ears: Bilaterally ear canals look normal. Tympanic membranes look intact, no hyperemia, fluid or retraction. Hearing grossly normal.      Nose: Mucosa looks normal. No purulent discharge. Septum essentially straight.     Oral Cavity/Mouth: Lips and tongue look normal.      Throat: No postnasal discharge. No tonsil hypertrophy. No hyperemia.    Neck: Symmetrical, trachea midline.     Pulmonary: Normal respiratory effort.     Lymphatic: No palpable pathologic lymph nodes at neck.     Neurological/Psychiatric Orientation to person, place, and time: Normal.     Mood and affect: Normal.      Extremities: No clubbing.     Skin: No significant skin lesions were noted at face or neck        Procedure       Last Recorded Vitals  There were no vitals taken for this visit.    Relevant Results  Prior to Admission medications    Medication Sig Start Date End Date Taking? Authorizing Provider   amLODIPine (Norvasc) 10 mg tablet TAKE 1 TABLET BY MOUTH ONCE  DAILY 9/26/24   Livia Jeronimo DO   atorvastatin (Lipitor) 40 mg tablet TAKE 1 TABLET (40 MG) BY MOUTH ONCE DAILY AT BEDTIME. 5/8/25 8/6/25  Livia Jeronimo DO   azelastine (Astelin) 137 mcg (0.1 %) nasal spray Administer 2 sprays into each nostril once daily in the evening. Use in each nostril as directed 2/25/25 2/25/26  NHAN Colvin   B complex-vitamin C-folic acid (Nephrocaps) 1 mg capsule Take 1 capsule by mouth once daily.    Historical Provider, MD   biotin 10 mg tablet Take 1 tablet (10 mg) by mouth once daily.    Historical Provider, MD   cholecalciferol (Vitamin D-3) 50 mcg (2,000 unit) capsule Take 1 capsule (50 mcg) by mouth once daily.    Historical Provider, MD   fluticasone (Flonase) 50 mcg/actuation nasal spray Administer 1 spray into each nostril 2 times a day. Shake gently. Before first use, prime pump. After use, clean tip and replace cap. 2/25/25 2/25/26  NHAN Colvin   hydroCHLOROthiazide (Microzide) 12.5 mg capsule TAKE 1 CAPSULE BY MOUTH ONCE  DAILY 11/16/24   Livia Jeronimo DO   meclizine (Antivert) 12.5 mg tablet Take 1-2 tablets (12.5-25 mg) by mouth 3 times a day as needed for dizziness. 5/8/25   Livia Jeronimo DO   methylPREDNISolone (Medrol Dospak) 4 mg tablets Take as directed on  package. 5/14/25 5/20/25  Livia Jeronimo DO   omeprazole (PriLOSEC) 20 mg DR capsule TAKE 1 CAPSULE BY MOUTH DAILY AS NEEDED FOR GASTROESOPHAGEAL  REFLUX DISEASE 10/3/24   Livia Jeronimo DO     Imaging  No results found.    Cardiology, Vascular, and Other Imaging  No other imaging results found for the past 7 days        Assessment and Plan:  Ely Mann is a 80 y.o. female, who presents for dizziness.  She is kindly referred by Dr. Jeronimo.    The patient presented to the ER on 5/4/2025 for dizziness and syncope. Symptoms began the previous day while she was using the bathroom--she became lightheaded and had to lower herself to the ground. The following day, she experienced another episode while getting ready for [employer], followed by several additional episodes of lightheadedness, particularly upon sitting up, including after attending [employer].    She has a history of fluid in the ear and acute otitis media with mastoiditis and was treated with antibiotics, after which her symptoms initially improved. She was referred to ENT and advised to use Astepro (azelastine) with Flonase (fluticasone), but she did not start the treatment as her symptoms had resolved at that time.    She continues to experience dizziness, left ear pain, headaches, and neck pain. She also reports bilateral TMJ symptoms with jaw clicking. She notes that these symptoms worsened after airline travel on 5/1/2025 (return flight), similar to prior episodes following air travel.    She has used oral medrol dose pack recently. She still feels dizzy off and on. Takes meclizine twice a day.     She still has positional short lasting dizziness, when she lays on her left side.  No ear fullness  No tinnitus.    On examination, TMs look intact.  [place] Hallpike maneuver has shown vertigo with nystagmus at left. Left Epley maneuver was performed.  It did not help with vertigo and/or nystagmus.  Left J maneuver was performed.    Plan  1-BPPV  recommendations  2-follow-up in 1 week      Janet Delgado MD  Otolaryngology - Head & Neck Surgery       [1]   Family History  Problem Relation Name Age of Onset    Ovarian cancer Mother  58    Breast cancer Daughter  40

## 2025-05-21 NOTE — PROGRESS NOTES
"History Of Present Illness  Ely Mann is a 80 y.o. female presenting with: \"  \".  She is kindly referred by .      Past Medical History  She has a past medical history of Body mass index (BMI) 31.0-31.9, adult (05/21/2021), Conjunctival hemorrhage, right eye (05/21/2021), Encounter for immunization (05/21/2021), Encounter for immunization (05/21/2021), Encounter for other screening for malignant neoplasm of breast (05/21/2021), Encounter for screening mammogram for malignant neoplasm of breast (05/21/2021), Other bursal cyst, unspecified site (10/27/2015), Other conditions influencing health status, Other injuries of right eye and orbit, sequela (05/21/2021), Other myositis, unspecified ankle and foot (05/21/2021), Other specified cough (05/21/2021), Pain in right leg (05/21/2021), Personal history of other diseases of the respiratory system (05/21/2021), Personal history of other diseases of the respiratory system, Personal history of other drug therapy (05/21/2021), Personal history of other medical treatment, Personal history of other specified conditions, and Personal history of other specified conditions (04/13/2020).    Surgical History  She has a past surgical history that includes Total abdominal hysterectomy (10/07/2021).     Social History  She reports that she has never smoked. She has never used smokeless tobacco. She reports that she does not currently use alcohol. She reports that she does not use drugs.    Family History  Family History[1]     Allergies  Ace inhibitors, Arb-angiotensin receptor antagonist, Aspirin, Caffeine, and Trolamine salicylate    Review of Systems        Physical Exam    General appearance: Healthy-appearing, well-nourished, well groomed, in no acute distress.     Head and Face: Atraumatic with no masses, lesions, or scarring.      Salivary glands: No tenderness of the parotid glands or parotid masses.     No tenderness of the submandibular glands or submandibular masses. "      Facial strength: Normal strength and symmetry, no synkinesis or facial tic.     Eyes: Conjunctivas look non-hyperemic bilaterally    Ears: Bilaterally ear canals look normal. Tympanic membranes look intact, no hyperemia, fluid or retraction. Hearing grossly normal.      Nose: Mucosa looks normal. No purulent discharge. Septum essentially straight.     Oral Cavity/Mouth: Lips and tongue look normal.     Throat: No postnasal discharge. No tonsil hypertrophy. No hyperemia.    Neck: Symmetrical, trachea midline.     Pulmonary: Normal respiratory effort.     Lymphatic: No palpable pathologic lymph nodes at neck.     Neurological/Psychiatric Orientation to person, place, and time: Normal.     Mood and affect: Normal.      Extremities: No clubbing.     Skin: No significant skin lesions were noted at face or neck        Procedure         Last Recorded Vitals  There were no vitals taken for this visit.    Relevant Results    Assessment and Plan:    Janet Delgado  Otolaryngology - Head & Neck Surgery       [1]   Family History  Problem Relation Name Age of Onset    Ovarian cancer Mother  58    Breast cancer Daughter  40

## 2025-05-28 ENCOUNTER — APPOINTMENT (OUTPATIENT)
Dept: OTOLARYNGOLOGY | Facility: CLINIC | Age: 80
End: 2025-05-28
Payer: MEDICARE

## 2025-05-28 DIAGNOSIS — Z86.69 HISTORY OF EAR DISORDER: Primary | ICD-10-CM

## 2025-05-28 PROCEDURE — 99213 OFFICE O/P EST LOW 20 MIN: CPT | Performed by: OTOLARYNGOLOGY

## 2025-05-28 NOTE — PROGRESS NOTES
History Of Present Illness    05.28.2025: She comes for follow up for left BPPV.  Feels better.  Florentino-Hallpike maneuver did not show any dizziness today.    Plan  1-follow-up as needed  _________________________________________________________________    Ely Mann is a 80 y.o. female, who presents for dizziness.  She is kindly referred by Dr. Jeronimo.    The patient presented to the ER on 5/4/2025 for dizziness and syncope. Symptoms began the previous day while she was using the bathroom--she became lightheaded and had to lower herself to the ground. The following day, she experienced another episode while getting ready for [employer], followed by several additional episodes of lightheadedness, particularly upon sitting up, including after attending [employer].    She has a history of fluid in the ear and acute otitis media with mastoiditis and was treated with antibiotics, after which her symptoms initially improved. She was referred to ENT and advised to use Astepro (azelastine) with Flonase (fluticasone), but she did not start the treatment as her symptoms had resolved at that time.    She continues to experience dizziness, left ear pain, headaches, and neck pain. She also reports bilateral TMJ symptoms with jaw clicking. She notes that these symptoms worsened after airline travel on 5/1/2025 (return flight), similar to prior episodes following air travel.    She has used oral medrol dose pack recently. She still feels dizzy off and on. Takes meclizine twice a day.     She still has positional short lasting dizziness, when she lays on her left side.  No ear fullness  No tinnitus.    On examination, TMs look intact.  [place] Hallpike maneuver has shown vertigo with nystagmus at left. Left Epley maneuver was performed.  It did not help with vertigo and/or nystagmus.  Left Karyna maneuver was performed.    Plan  1-BPPV recommendations  2-follow-up in 1 week      Past Medical History  She has a past medical history of Body mass  index (BMI) 31.0-31.9, adult (05/21/2021), Conjunctival hemorrhage, right eye (05/21/2021), Encounter for immunization (05/21/2021), Encounter for immunization (05/21/2021), Encounter for other screening for malignant neoplasm of breast (05/21/2021), Encounter for screening mammogram for malignant neoplasm of breast (05/21/2021), Other bursal cyst, unspecified site (10/27/2015), Other conditions influencing health status, Other injuries of right eye and orbit, sequela (05/21/2021), Other myositis, unspecified ankle and foot (05/21/2021), Other specified cough (05/21/2021), Pain in right leg (05/21/2021), Personal history of other diseases of the respiratory system (05/21/2021), Personal history of other diseases of the respiratory system, Personal history of other drug therapy (05/21/2021), Personal history of other medical treatment, Personal history of other specified conditions, and Personal history of other specified conditions (04/13/2020).    Surgical History  She has a past surgical history that includes Total abdominal hysterectomy (10/07/2021).     Social History  She reports that she has never smoked. She has never used smokeless tobacco. She reports that she does not currently use alcohol. She reports that she does not use drugs.    Family History  Family History[1]     Allergies  Ace inhibitors, Arb-angiotensin receptor antagonist, Aspirin, Caffeine, and Trolamine salicylate    Review of Systems   dizziness     Physical Exam    General appearance: Healthy-appearing, well-nourished, well groomed, in no acute distress.     Head and Face: Atraumatic with no masses, lesions, or scarring.      Salivary glands: No tenderness of the parotid glands or parotid masses.     No tenderness of the submandibular glands or submandibular masses.      Facial strength: Normal strength and symmetry, no synkinesis or facial tic.     Eyes: Conjunctivas look non-hyperemic bilaterally    Ears: Bilaterally ear canals look  normal. Tympanic membranes look intact, no hyperemia, fluid or retraction. Hearing grossly normal.      Nose: Mucosa looks normal. No purulent discharge. Septum essentially straight.     Oral Cavity/Mouth: Lips and tongue look normal.     Throat: No postnasal discharge. No tonsil hypertrophy. No hyperemia.    Neck: Symmetrical, trachea midline.     Pulmonary: Normal respiratory effort.     Lymphatic: No palpable pathologic lymph nodes at neck.     Neurological/Psychiatric Orientation to person, place, and time: Normal.     Mood and affect: Normal.      Extremities: No clubbing.     Skin: No significant skin lesions were noted at face or neck        Procedure       Last Recorded Vitals  There were no vitals taken for this visit.    Relevant Results  Prior to Admission medications    Medication Sig Start Date End Date Taking? Authorizing Provider   amLODIPine (Norvasc) 10 mg tablet TAKE 1 TABLET BY MOUTH ONCE  DAILY 9/26/24   Livia Jeronimo DO   atorvastatin (Lipitor) 40 mg tablet TAKE 1 TABLET (40 MG) BY MOUTH ONCE DAILY AT BEDTIME. 5/8/25 8/6/25  Livia Jeronimo DO   azelastine (Astelin) 137 mcg (0.1 %) nasal spray Administer 2 sprays into each nostril once daily in the evening. Use in each nostril as directed 2/25/25 2/25/26  NHAN Colvin   B complex-vitamin C-folic acid (Nephrocaps) 1 mg capsule Take 1 capsule by mouth once daily.    Historical Provider, MD   biotin 10 mg tablet Take 1 tablet (10 mg) by mouth once daily.    Historical Provider, MD   cholecalciferol (Vitamin D-3) 50 mcg (2,000 unit) capsule Take 1 capsule (50 mcg) by mouth once daily.    Historical Provider, MD   fluticasone (Flonase) 50 mcg/actuation nasal spray Administer 1 spray into each nostril 2 times a day. Shake gently. Before first use, prime pump. After use, clean tip and replace cap. 2/25/25 2/25/26  NHAN Colvin   hydroCHLOROthiazide (Microzide) 12.5 mg capsule TAKE 1 CAPSULE BY MOUTH ONCE  DAILY 11/16/24    Livia Jeronimo DO   meclizine (Antivert) 12.5 mg tablet Take 1-2 tablets (12.5-25 mg) by mouth 3 times a day as needed for dizziness. 5/8/25   Livia Jeronimo DO   methylPREDNISolone (Medrol Dospak) 4 mg tablets Take as directed on package. 5/14/25 5/20/25  Livia Jeronimo DO   omeprazole (PriLOSEC) 20 mg DR capsule TAKE 1 CAPSULE BY MOUTH DAILY AS NEEDED FOR GASTROESOPHAGEAL  REFLUX DISEASE 10/3/24   Livia Jeronimo DO             Assessment and Plan:    05.28.2025: She comes for follow up for left BPPV.  Feels better.  Florentino-Hallpike maneuver did not show any dizziness today.    Plan  1-follow-up as needed  _________________________________________________________________    Ely Mann is a 80 y.o. female, who presents for dizziness.  She is kindly referred by Dr. Jeronimo.    The patient presented to the ER on 5/4/2025 for dizziness and syncope. Symptoms began the previous day while she was using the bathroom--she became lightheaded and had to lower herself to the ground. The following day, she experienced another episode while getting ready for [employer], followed by several additional episodes of lightheadedness, particularly upon sitting up, including after attending [employer].    She has a history of fluid in the ear and acute otitis media with mastoiditis and was treated with antibiotics, after which her symptoms initially improved. She was referred to ENT and advised to use Astepro (azelastine) with Flonase (fluticasone), but she did not start the treatment as her symptoms had resolved at that time.    She continues to experience dizziness, left ear pain, headaches, and neck pain. She also reports bilateral TMJ symptoms with jaw clicking. She notes that these symptoms worsened after airline travel on 5/1/2025 (return flight), similar to prior episodes following air travel.    She has used oral medrol dose pack recently. She still feels dizzy off and on. Takes meclizine twice a day.     She still has positional  short lasting dizziness, when she lays on her left side.  No ear fullness  No tinnitus.    On examination, TMs look intact.  [place] Hallpike maneuver has shown vertigo with nystagmus at left. Left Epley maneuver was performed.  It did not help with vertigo and/or nystagmus.  Left Karyna maneuver was performed.    Plan  1-BPPV recommendations  2-follow-up in 1 week      Janet Delgado MD  Otolaryngology - Head & Neck Surgery         [1]   Family History  Problem Relation Name Age of Onset    Ovarian cancer Mother  58    Breast cancer Daughter  40

## 2025-06-03 ENCOUNTER — PATIENT MESSAGE (OUTPATIENT)
Dept: PRIMARY CARE | Facility: CLINIC | Age: 80
End: 2025-06-03

## 2025-06-03 ENCOUNTER — APPOINTMENT (OUTPATIENT)
Dept: OTOLARYNGOLOGY | Facility: CLINIC | Age: 80
End: 2025-06-03
Payer: MEDICARE

## 2025-07-10 ENCOUNTER — APPOINTMENT (OUTPATIENT)
Dept: OTOLARYNGOLOGY | Facility: CLINIC | Age: 80
End: 2025-07-10
Payer: MEDICARE

## 2025-07-11 ENCOUNTER — APPOINTMENT (OUTPATIENT)
Dept: NEUROLOGY | Facility: HOSPITAL | Age: 80
End: 2025-07-11
Payer: MEDICARE

## 2025-08-13 DIAGNOSIS — K21.9 GASTROESOPHAGEAL REFLUX DISEASE WITHOUT ESOPHAGITIS: ICD-10-CM

## 2025-08-21 RX ORDER — OMEPRAZOLE 20 MG/1
CAPSULE, DELAYED RELEASE ORAL
Qty: 90 CAPSULE | Refills: 0 | Status: SHIPPED | OUTPATIENT
Start: 2025-08-21

## 2025-09-29 ENCOUNTER — APPOINTMENT (OUTPATIENT)
Dept: PRIMARY CARE | Facility: CLINIC | Age: 80
End: 2025-09-29
Payer: MEDICARE

## 2025-10-20 ENCOUNTER — APPOINTMENT (OUTPATIENT)
Dept: SLEEP MEDICINE | Facility: CLINIC | Age: 80
End: 2025-10-20
Payer: MEDICARE